# Patient Record
Sex: MALE | Race: WHITE | NOT HISPANIC OR LATINO | Employment: OTHER | URBAN - METROPOLITAN AREA
[De-identification: names, ages, dates, MRNs, and addresses within clinical notes are randomized per-mention and may not be internally consistent; named-entity substitution may affect disease eponyms.]

---

## 2018-02-01 ENCOUNTER — APPOINTMENT (OUTPATIENT)
Dept: CT IMAGING | Facility: HOSPITAL | Age: 83
End: 2018-02-01

## 2018-02-01 ENCOUNTER — APPOINTMENT (OUTPATIENT)
Dept: GENERAL RADIOLOGY | Facility: HOSPITAL | Age: 83
End: 2018-02-01

## 2018-02-01 ENCOUNTER — HOSPITAL ENCOUNTER (INPATIENT)
Facility: HOSPITAL | Age: 83
LOS: 4 days | Discharge: HOME | End: 2018-02-05
Attending: EMERGENCY MEDICINE | Admitting: HOSPITALIST

## 2018-02-01 DIAGNOSIS — R79.89 ELEVATED LFTS: ICD-10-CM

## 2018-02-01 DIAGNOSIS — K80.51 CALCULUS OF BILE DUCT WITHOUT CHOLECYSTITIS WITH OBSTRUCTION: ICD-10-CM

## 2018-02-01 DIAGNOSIS — R79.1 SUPRATHERAPEUTIC INR: ICD-10-CM

## 2018-02-01 DIAGNOSIS — E80.6 HYPERBILIRUBINEMIA: ICD-10-CM

## 2018-02-01 DIAGNOSIS — R10.9 ABDOMINAL PAIN, UNSPECIFIED ABDOMINAL LOCATION: Primary | ICD-10-CM

## 2018-02-01 DIAGNOSIS — D72.829 LEUKOCYTOSIS, UNSPECIFIED TYPE: ICD-10-CM

## 2018-02-01 PROBLEM — I10 ESSENTIAL HYPERTENSION: Status: ACTIVE | Noted: 2018-02-01

## 2018-02-01 PROBLEM — I25.10 CAD (CORONARY ARTERY DISEASE): Status: ACTIVE | Noted: 2018-02-01

## 2018-02-01 PROBLEM — J90 PLEURAL EFFUSION, BILATERAL: Status: ACTIVE | Noted: 2018-02-01

## 2018-02-01 PROBLEM — I48.91 A-FIB: Status: ACTIVE | Noted: 2018-02-01

## 2018-02-01 PROBLEM — E78.5 HYPERLIPIDEMIA: Status: ACTIVE | Noted: 2018-02-01

## 2018-02-01 LAB
ALBUMIN SERPL-MCNC: 3.5 G/DL (ref 3.2–4.8)
ALBUMIN/GLOB SERPL: 1.3 G/DL (ref 1.5–2.5)
ALP SERPL-CCNC: 650 U/L (ref 25–100)
ALT SERPL W P-5'-P-CCNC: 225 U/L (ref 7–40)
ANION GAP SERPL CALCULATED.3IONS-SCNC: 8 MMOL/L (ref 3–11)
AST SERPL-CCNC: 154 U/L (ref 0–33)
BACTERIA UR QL AUTO: ABNORMAL /HPF
BASOPHILS # BLD AUTO: 0.02 10*3/MM3 (ref 0–0.2)
BASOPHILS NFR BLD AUTO: 0.1 % (ref 0–1)
BILIRUB SERPL-MCNC: 5.6 MG/DL (ref 0.3–1.2)
BILIRUB UR QL STRIP: ABNORMAL
BUN BLD-MCNC: 22 MG/DL (ref 9–23)
BUN/CREAT SERPL: 20 (ref 7–25)
CALCIUM SPEC-SCNC: 8.9 MG/DL (ref 8.7–10.4)
CHLORIDE SERPL-SCNC: 103 MMOL/L (ref 99–109)
CLARITY UR: ABNORMAL
CO2 SERPL-SCNC: 25 MMOL/L (ref 20–31)
COLOR UR: ABNORMAL
CREAT BLD-MCNC: 1.1 MG/DL (ref 0.6–1.3)
DEPRECATED RDW RBC AUTO: 49.1 FL (ref 37–54)
EOSINOPHIL # BLD AUTO: 0.08 10*3/MM3 (ref 0–0.3)
EOSINOPHIL NFR BLD AUTO: 0.5 % (ref 0–3)
ERYTHROCYTE [DISTWIDTH] IN BLOOD BY AUTOMATED COUNT: 15.2 % (ref 11.3–14.5)
GFR SERPL CREATININE-BSD FRML MDRD: 63 ML/MIN/1.73
GLOBULIN UR ELPH-MCNC: 2.7 GM/DL
GLUCOSE BLD-MCNC: 106 MG/DL (ref 70–100)
GLUCOSE UR STRIP-MCNC: NEGATIVE MG/DL
HCT VFR BLD AUTO: 43.2 % (ref 38.9–50.9)
HGB BLD-MCNC: 14.2 G/DL (ref 13.1–17.5)
HGB UR QL STRIP.AUTO: NEGATIVE
HYALINE CASTS UR QL AUTO: ABNORMAL /LPF
IMM GRANULOCYTES # BLD: 0.09 10*3/MM3 (ref 0–0.03)
IMM GRANULOCYTES NFR BLD: 0.5 % (ref 0–0.6)
INR PPP: 4.86
INR PPP: 5.25
KETONES UR QL STRIP: NEGATIVE
LEUKOCYTE ESTERASE UR QL STRIP.AUTO: ABNORMAL
LIPASE SERPL-CCNC: 82 U/L (ref 6–51)
LYMPHOCYTES # BLD AUTO: 0.73 10*3/MM3 (ref 0.6–4.8)
LYMPHOCYTES NFR BLD AUTO: 4.4 % (ref 24–44)
MCH RBC QN AUTO: 28.9 PG (ref 27–31)
MCHC RBC AUTO-ENTMCNC: 32.9 G/DL (ref 32–36)
MCV RBC AUTO: 88 FL (ref 80–99)
MONOCYTES # BLD AUTO: 1.2 10*3/MM3 (ref 0–1)
MONOCYTES NFR BLD AUTO: 7.3 % (ref 0–12)
MUCOUS THREADS URNS QL MICRO: ABNORMAL /HPF
NEUTROPHILS # BLD AUTO: 14.42 10*3/MM3 (ref 1.5–8.3)
NEUTROPHILS NFR BLD AUTO: 87.2 % (ref 41–71)
NITRITE UR QL STRIP: NEGATIVE
PH UR STRIP.AUTO: 5.5 [PH] (ref 5–8)
PLATELET # BLD AUTO: 331 10*3/MM3 (ref 150–450)
PMV BLD AUTO: 11.5 FL (ref 6–12)
POTASSIUM BLD-SCNC: 4.6 MMOL/L (ref 3.5–5.5)
PROT SERPL-MCNC: 6.2 G/DL (ref 5.7–8.2)
PROT UR QL STRIP: ABNORMAL
PROTHROMBIN TIME: 55.6 SECONDS (ref 9.6–11.5)
PROTHROMBIN TIME: 60.2 SECONDS (ref 9.6–11.5)
RBC # BLD AUTO: 4.91 10*6/MM3 (ref 4.2–5.76)
RBC # UR: ABNORMAL /HPF
REF LAB TEST METHOD: ABNORMAL
SODIUM BLD-SCNC: 136 MMOL/L (ref 132–146)
SP GR UR STRIP: 1.02 (ref 1–1.03)
SQUAMOUS #/AREA URNS HPF: ABNORMAL /HPF
UROBILINOGEN UR QL STRIP: ABNORMAL
WBC NRBC COR # BLD: 16.54 10*3/MM3 (ref 3.5–10.8)
WBC UR QL AUTO: ABNORMAL /HPF

## 2018-02-01 PROCEDURE — 80053 COMPREHEN METABOLIC PANEL: CPT | Performed by: EMERGENCY MEDICINE

## 2018-02-01 PROCEDURE — 93005 ELECTROCARDIOGRAM TRACING: CPT | Performed by: EMERGENCY MEDICINE

## 2018-02-01 PROCEDURE — 83690 ASSAY OF LIPASE: CPT | Performed by: EMERGENCY MEDICINE

## 2018-02-01 PROCEDURE — 85610 PROTHROMBIN TIME: CPT | Performed by: NURSE PRACTITIONER

## 2018-02-01 PROCEDURE — 85025 COMPLETE CBC W/AUTO DIFF WBC: CPT | Performed by: EMERGENCY MEDICINE

## 2018-02-01 PROCEDURE — 0 IOPAMIDOL 61 % SOLUTION: Performed by: EMERGENCY MEDICINE

## 2018-02-01 PROCEDURE — 99223 1ST HOSP IP/OBS HIGH 75: CPT | Performed by: INTERNAL MEDICINE

## 2018-02-01 PROCEDURE — 81001 URINALYSIS AUTO W/SCOPE: CPT | Performed by: EMERGENCY MEDICINE

## 2018-02-01 PROCEDURE — 87086 URINE CULTURE/COLONY COUNT: CPT | Performed by: EMERGENCY MEDICINE

## 2018-02-01 PROCEDURE — 71045 X-RAY EXAM CHEST 1 VIEW: CPT

## 2018-02-01 PROCEDURE — 85610 PROTHROMBIN TIME: CPT | Performed by: EMERGENCY MEDICINE

## 2018-02-01 PROCEDURE — 74177 CT ABD & PELVIS W/CONTRAST: CPT

## 2018-02-01 PROCEDURE — 25010000002 ONDANSETRON PER 1 MG: Performed by: EMERGENCY MEDICINE

## 2018-02-01 PROCEDURE — 99285 EMERGENCY DEPT VISIT HI MDM: CPT

## 2018-02-01 RX ORDER — PANTOPRAZOLE SODIUM 40 MG/1
40 TABLET, DELAYED RELEASE ORAL DAILY
COMMUNITY
End: 2022-10-18

## 2018-02-01 RX ORDER — ONDANSETRON 2 MG/ML
4 INJECTION INTRAMUSCULAR; INTRAVENOUS ONCE
Status: COMPLETED | OUTPATIENT
Start: 2018-02-01 | End: 2018-02-01

## 2018-02-01 RX ORDER — LEVOTHYROXINE SODIUM 0.03 MG/1
25 TABLET ORAL DAILY
COMMUNITY
End: 2022-03-22 | Stop reason: SDUPTHER

## 2018-02-01 RX ORDER — TERAZOSIN 5 MG/1
5 CAPSULE ORAL NIGHTLY
COMMUNITY
End: 2022-04-06 | Stop reason: SDUPTHER

## 2018-02-01 RX ORDER — ALLOPURINOL 100 MG/1
100 TABLET ORAL DAILY
COMMUNITY
End: 2022-04-06 | Stop reason: SDUPTHER

## 2018-02-01 RX ORDER — METOPROLOL TARTRATE 50 MG/1
50 TABLET, FILM COATED ORAL DAILY
COMMUNITY
End: 2022-08-04

## 2018-02-01 RX ORDER — FUROSEMIDE 20 MG/1
20 TABLET ORAL DAILY
COMMUNITY
End: 2022-04-06 | Stop reason: SDUPTHER

## 2018-02-01 RX ORDER — ONDANSETRON 4 MG/1
4 TABLET, FILM COATED ORAL EVERY 6 HOURS PRN
Status: DISCONTINUED | OUTPATIENT
Start: 2018-02-01 | End: 2018-02-05 | Stop reason: HOSPADM

## 2018-02-01 RX ORDER — PROMETHAZINE HYDROCHLORIDE 25 MG/1
25 TABLET ORAL EVERY 6 HOURS PRN
COMMUNITY
End: 2022-10-18

## 2018-02-01 RX ORDER — ONDANSETRON 2 MG/ML
4 INJECTION INTRAMUSCULAR; INTRAVENOUS EVERY 6 HOURS PRN
Status: DISCONTINUED | OUTPATIENT
Start: 2018-02-01 | End: 2018-02-05 | Stop reason: HOSPADM

## 2018-02-01 RX ORDER — SODIUM CHLORIDE 0.9 % (FLUSH) 0.9 %
10 SYRINGE (ML) INJECTION AS NEEDED
Status: DISCONTINUED | OUTPATIENT
Start: 2018-02-01 | End: 2018-02-05 | Stop reason: HOSPADM

## 2018-02-01 RX ORDER — SODIUM CHLORIDE 9 MG/ML
75 INJECTION, SOLUTION INTRAVENOUS CONTINUOUS
Status: DISCONTINUED | OUTPATIENT
Start: 2018-02-01 | End: 2018-02-04

## 2018-02-01 RX ORDER — POTASSIUM CHLORIDE 20 MEQ/1
20 TABLET, EXTENDED RELEASE ORAL 2 TIMES DAILY
COMMUNITY
End: 2022-04-06 | Stop reason: SDUPTHER

## 2018-02-01 RX ORDER — WARFARIN SODIUM 2.5 MG/1
2.5 TABLET ORAL
COMMUNITY
End: 2022-10-13

## 2018-02-01 RX ORDER — ASPIRIN 81 MG/1
81 TABLET ORAL DAILY
COMMUNITY
End: 2022-10-18

## 2018-02-01 RX ORDER — SIMVASTATIN 10 MG
10 TABLET ORAL NIGHTLY
COMMUNITY
End: 2018-02-05 | Stop reason: HOSPADM

## 2018-02-01 RX ORDER — SODIUM CHLORIDE 0.9 % (FLUSH) 0.9 %
1-10 SYRINGE (ML) INJECTION AS NEEDED
Status: DISCONTINUED | OUTPATIENT
Start: 2018-02-01 | End: 2018-02-05 | Stop reason: HOSPADM

## 2018-02-01 RX ORDER — AMLODIPINE BESYLATE 10 MG/1
10 TABLET ORAL DAILY
COMMUNITY
End: 2022-03-29

## 2018-02-01 RX ADMIN — ONDANSETRON 4 MG: 2 INJECTION INTRAMUSCULAR; INTRAVENOUS at 17:15

## 2018-02-01 RX ADMIN — SODIUM CHLORIDE 75 ML/HR: 9 INJECTION, SOLUTION INTRAVENOUS at 23:10

## 2018-02-01 RX ADMIN — Medication 10 ML: at 17:10

## 2018-02-01 RX ADMIN — Medication 10 ML: at 17:16

## 2018-02-01 RX ADMIN — IOPAMIDOL 95 ML: 612 INJECTION, SOLUTION INTRAVENOUS at 18:18

## 2018-02-01 NOTE — ED PROVIDER NOTES
Subjective   HPI Comments: 88 year-old male presents for evaluation of abnormal labs.  The patient states that for the past couple of weeks, he has been experiencing intermittent right-sided abdominal pain.  He is currently pain-free.  He went to his primary care physician regarding his symptoms last week and was prescribed Pepcid which helped for 3 days before the pain returned.  He returned his primary care physician midweek and labs were drawn.  His physician called him today as his LFTs were markedly abnormal, and he was advised to come to the ED for further evaluation and treatment.  Currently, the patient is asymptomatic.  He denies any abdominal pain or nausea at this time.  However, over the past few days he has been experiencing nausea with his pain.    Patient is a 88 y.o. male presenting with general illness.   History provided by:  Patient  Illness   Severity:  Moderate  Onset quality:  Sudden  Timing:  Constant  Progression:  Worsening  Chronicity:  New  Context:  Abnormal lab results recommended to visit the ED.  Associated symptoms: abdominal pain    Associated symptoms: no diarrhea, no nausea and no vomiting        Review of Systems   Gastrointestinal: Positive for abdominal pain. Negative for diarrhea, nausea and vomiting.   All other systems reviewed and are negative.      Past Medical History:   Diagnosis Date   • Hard of hearing        Allergies   Allergen Reactions   • Penicillins Swelling       Past Surgical History:   Procedure Laterality Date   • CHOLECYSTECTOMY     • HERNIA REPAIR         History reviewed. No pertinent family history.    Social History     Social History   • Marital status:      Spouse name: N/A   • Number of children: N/A   • Years of education: N/A     Social History Main Topics   • Smoking status: Never Smoker   • Smokeless tobacco: None   • Alcohol use No   • Drug use: No   • Sexual activity: Not Asked     Other Topics Concern   • None     Social History Narrative    • None         Objective   Physical Exam   Constitutional: He is oriented to person, place, and time. He appears well-developed and well-nourished. No distress.   Well-appearing elderly male in no acute distress   HENT:   Head: Normocephalic and atraumatic.   Mouth/Throat: Oropharynx is clear and moist.   Eyes:   Sclera icteric   Neck: Normal range of motion. No JVD present.   Cardiovascular: Normal rate, regular rhythm and normal heart sounds.  Exam reveals no gallop and no friction rub.    No murmur heard.  Pulmonary/Chest: Effort normal and breath sounds normal. No respiratory distress. He has no wheezes. He has no rales.   Abdominal: Soft. Bowel sounds are normal. He exhibits no distension and no mass. There is no tenderness. There is no guarding.   No focal tenderness to palpation; no peritoneal signs   Musculoskeletal: Normal range of motion. He exhibits no edema.   Neurological: He is alert and oriented to person, place, and time.   Skin: Skin is warm and dry. No rash noted. He is not diaphoretic. No erythema. No pallor.   Psychiatric: He has a normal mood and affect. Judgment and thought content normal.   Nursing note and vitals reviewed.      Procedures         ED Course  ED Course   Comment By Time   88-year-old male sent from his primary care physician's office for evaluation of abnormal labs.  Of note, the patient has been experiencing intermittent right-sided abdominal pain for the past several weeks.  He had labs drawn this week that revealed markedly elevated LFTs, prompting his physician to send him to the ED for evaluation.  Currently, the patient denies any pain.  Nonsurgical abdomen.  Sclera icteric.  We will repeat labs and obtain abdominal imaging and reassess after initial interventions. Gordo Ramon MD 02/01 1745   Labs remarkable for white blood cell count of 16, INR greater than 5, elevated LFTs, and elevated total bilirubin. Gordo Ramon MD 02/01 1903   CT remarkable for  biliary ductal dilatation with a filling defect in the distal common bile duct which could be secondary to either a calcified stone or a mass.  Given CT findings and lab abnormalities, I will seek admission to the hospital for an MRCP, likely GI consult, and further evaluation and treatment. Gordo Ramon MD 02/01 1917   Discussed case with Dr. Green and will admit for further evaluation and treatment.   hemodynamically stable and aware/agreeable with plan. Gordo Ramon MD 02/01 2005     Recent Results (from the past 24 hour(s))   Comprehensive Metabolic Panel    Collection Time: 02/01/18  5:11 PM   Result Value Ref Range    Glucose 106 (H) 70 - 100 mg/dL    BUN 22 9 - 23 mg/dL    Creatinine 1.10 0.60 - 1.30 mg/dL    Sodium 136 132 - 146 mmol/L    Potassium 4.6 3.5 - 5.5 mmol/L    Chloride 103 99 - 109 mmol/L    CO2 25.0 20.0 - 31.0 mmol/L    Calcium 8.9 8.7 - 10.4 mg/dL    Total Protein 6.2 5.7 - 8.2 g/dL    Albumin 3.50 3.20 - 4.80 g/dL    ALT (SGPT) 225 (H) 7 - 40 U/L    AST (SGOT) 154 (H) 0 - 33 U/L    Alkaline Phosphatase 650 (H) 25 - 100 U/L    Total Bilirubin 5.6 (H) 0.3 - 1.2 mg/dL    eGFR Non African Amer 63 >60 mL/min/1.73    Globulin 2.7 gm/dL    A/G Ratio 1.3 (L) 1.5 - 2.5 g/dL    BUN/Creatinine Ratio 20.0 7.0 - 25.0    Anion Gap 8.0 3.0 - 11.0 mmol/L   Lipase    Collection Time: 02/01/18  5:11 PM   Result Value Ref Range    Lipase 82 (H) 6 - 51 U/L   Protime-INR    Collection Time: 02/01/18  5:11 PM   Result Value Ref Range    Protime 60.2 (C) 9.6 - 11.5 Seconds    INR 5.25    CBC Auto Differential    Collection Time: 02/01/18  5:11 PM   Result Value Ref Range    WBC 16.54 (H) 3.50 - 10.80 10*3/mm3    RBC 4.91 4.20 - 5.76 10*6/mm3    Hemoglobin 14.2 13.1 - 17.5 g/dL    Hematocrit 43.2 38.9 - 50.9 %    MCV 88.0 80.0 - 99.0 fL    MCH 28.9 27.0 - 31.0 pg    MCHC 32.9 32.0 - 36.0 g/dL    RDW 15.2 (H) 11.3 - 14.5 %    RDW-SD 49.1 37.0 - 54.0 fl    MPV 11.5 6.0 - 12.0 fL    Platelets 331 150  - 450 10*3/mm3    Neutrophil % 87.2 (H) 41.0 - 71.0 %    Lymphocyte % 4.4 (L) 24.0 - 44.0 %    Monocyte % 7.3 0.0 - 12.0 %    Eosinophil % 0.5 0.0 - 3.0 %    Basophil % 0.1 0.0 - 1.0 %    Immature Grans % 0.5 0.0 - 0.6 %    Neutrophils, Absolute 14.42 (H) 1.50 - 8.30 10*3/mm3    Lymphocytes, Absolute 0.73 0.60 - 4.80 10*3/mm3    Monocytes, Absolute 1.20 (H) 0.00 - 1.00 10*3/mm3    Eosinophils, Absolute 0.08 0.00 - 0.30 10*3/mm3    Basophils, Absolute 0.02 0.00 - 0.20 10*3/mm3    Immature Grans, Absolute 0.09 (H) 0.00 - 0.03 10*3/mm3   Urinalysis With / Culture If Indicated - Urine, Clean Catch    Collection Time: 02/01/18  5:26 PM   Result Value Ref Range    Color, UA Orange (A) Yellow, Straw    Appearance, UA Cloudy (A) Clear    pH, UA 5.5 5.0 - 8.0    Specific Gravity, UA 1.020 1.001 - 1.030    Glucose, UA Negative Negative    Ketones, UA Negative Negative    Bilirubin, UA Large (3+) (A) Negative    Blood, UA Negative Negative    Protein,  mg/dL (2+) (A) Negative    Leuk Esterase, UA Small (1+) (A) Negative    Nitrite, UA Negative Negative    Urobilinogen, UA 1.0 E.U./dL 0.2 - 1.0 E.U./dL   Urinalysis, Microscopic Only - Urine, Clean Catch    Collection Time: 02/01/18  5:26 PM   Result Value Ref Range    RBC, UA 0-2 None Seen, 0-2 /HPF    WBC, UA 0-2 (A) None Seen /HPF    Bacteria, UA Trace None Seen, Trace /HPF    Squamous Epithelial Cells, UA 0-2 None Seen, 0-2 /HPF    Hyaline Casts, UA 0-6 0 - 6 /LPF    Mucus, UA Trace None Seen, Trace /HPF    Methodology Manual Light Microscopy      Note: In addition to lab results from this visit, the labs listed above may include labs taken at another facility or during a different encounter within the last 24 hours. Please correlate lab times with ED admission and discharge times for further clarification of the services performed during this visit.    CT Abdomen Pelvis With Contrast   Final Result     Biliary ductal dilatation.Asymptomatic bile duct dilatation is  "considered    normal after cholecystectomy. However there is question of a slightly    hyperdense filling defect in distal common bile duct which could be partially    calcified stone or soft tissue mass. Clinical correlation is recommended as to    whether the patient is symptomatic. MRCP would be helpful for further    evaluation.      THIS DOCUMENT HAS BEEN ELECTRONICALLY SIGNED BY LAKESHA VICENTE MD      XR Chest 1 View   Final Result   Cardiomegaly with increased central pulmonary vascularity   and ill-defined bibasilar pulmonary opacifications greatest on the   right. Findings are most consistent with probable trace bilateral   pleural effusions.       DICTATED:     02/01/2018   EDITED    :     02/01/2018        This report was finalized on 2/1/2018 5:41 PM by Dr. Alden Kent.          MRI abdomen wo contrast mrcp    (Results Pending)     Vitals:    02/01/18 1621 02/01/18 1728 02/01/18 1800 02/01/18 1900   BP: 139/82 131/85 125/81 132/87   BP Location: Left arm      Patient Position: Sitting      Pulse: 92 88 83 80   Resp: 16 16     Temp: 97.6 °F (36.4 °C)      TempSrc: Oral      SpO2: 97% 97% 96% 96%   Weight: 83.5 kg (184 lb)      Height: 175.3 cm (69\")        Medications   sodium chloride 0.9 % flush 10 mL (10 mL Intravenous Given 2/1/18 1716)   ondansetron (ZOFRAN) injection 4 mg (4 mg Intravenous Given 2/1/18 1715)   iopamidol (ISOVUE-300) 61 % injection 100 mL (95 mL Intravenous Given 2/1/18 1818)     ECG/EMG Results (last 24 hours)     ** No results found for the last 24 hours. **                    Recent Results (from the past 24 hour(s))   Comprehensive Metabolic Panel    Collection Time: 02/01/18  5:11 PM   Result Value Ref Range    Glucose 106 (H) 70 - 100 mg/dL    BUN 22 9 - 23 mg/dL    Creatinine 1.10 0.60 - 1.30 mg/dL    Sodium 136 132 - 146 mmol/L    Potassium 4.6 3.5 - 5.5 mmol/L    Chloride 103 99 - 109 mmol/L    CO2 25.0 20.0 - 31.0 mmol/L    Calcium 8.9 8.7 - 10.4 mg/dL    Total Protein 6.2 " 5.7 - 8.2 g/dL    Albumin 3.50 3.20 - 4.80 g/dL    ALT (SGPT) 225 (H) 7 - 40 U/L    AST (SGOT) 154 (H) 0 - 33 U/L    Alkaline Phosphatase 650 (H) 25 - 100 U/L    Total Bilirubin 5.6 (H) 0.3 - 1.2 mg/dL    eGFR Non African Amer 63 >60 mL/min/1.73    Globulin 2.7 gm/dL    A/G Ratio 1.3 (L) 1.5 - 2.5 g/dL    BUN/Creatinine Ratio 20.0 7.0 - 25.0    Anion Gap 8.0 3.0 - 11.0 mmol/L   Lipase    Collection Time: 02/01/18  5:11 PM   Result Value Ref Range    Lipase 82 (H) 6 - 51 U/L   Protime-INR    Collection Time: 02/01/18  5:11 PM   Result Value Ref Range    Protime 60.2 (C) 9.6 - 11.5 Seconds    INR 5.25    CBC Auto Differential    Collection Time: 02/01/18  5:11 PM   Result Value Ref Range    WBC 16.54 (H) 3.50 - 10.80 10*3/mm3    RBC 4.91 4.20 - 5.76 10*6/mm3    Hemoglobin 14.2 13.1 - 17.5 g/dL    Hematocrit 43.2 38.9 - 50.9 %    MCV 88.0 80.0 - 99.0 fL    MCH 28.9 27.0 - 31.0 pg    MCHC 32.9 32.0 - 36.0 g/dL    RDW 15.2 (H) 11.3 - 14.5 %    RDW-SD 49.1 37.0 - 54.0 fl    MPV 11.5 6.0 - 12.0 fL    Platelets 331 150 - 450 10*3/mm3    Neutrophil % 87.2 (H) 41.0 - 71.0 %    Lymphocyte % 4.4 (L) 24.0 - 44.0 %    Monocyte % 7.3 0.0 - 12.0 %    Eosinophil % 0.5 0.0 - 3.0 %    Basophil % 0.1 0.0 - 1.0 %    Immature Grans % 0.5 0.0 - 0.6 %    Neutrophils, Absolute 14.42 (H) 1.50 - 8.30 10*3/mm3    Lymphocytes, Absolute 0.73 0.60 - 4.80 10*3/mm3    Monocytes, Absolute 1.20 (H) 0.00 - 1.00 10*3/mm3    Eosinophils, Absolute 0.08 0.00 - 0.30 10*3/mm3    Basophils, Absolute 0.02 0.00 - 0.20 10*3/mm3    Immature Grans, Absolute 0.09 (H) 0.00 - 0.03 10*3/mm3   Urinalysis With / Culture If Indicated - Urine, Clean Catch    Collection Time: 02/01/18  5:26 PM   Result Value Ref Range    Color, UA Orange (A) Yellow, Straw    Appearance, UA Cloudy (A) Clear    pH, UA 5.5 5.0 - 8.0    Specific Gravity, UA 1.020 1.001 - 1.030    Glucose, UA Negative Negative    Ketones, UA Negative Negative    Bilirubin, UA Large (3+) (A) Negative    Blood,  UA Negative Negative    Protein,  mg/dL (2+) (A) Negative    Leuk Esterase, UA Small (1+) (A) Negative    Nitrite, UA Negative Negative    Urobilinogen, UA 1.0 E.U./dL 0.2 - 1.0 E.U./dL   Urinalysis, Microscopic Only - Urine, Clean Catch    Collection Time: 02/01/18  5:26 PM   Result Value Ref Range    RBC, UA 0-2 None Seen, 0-2 /HPF    WBC, UA 0-2 (A) None Seen /HPF    Bacteria, UA Trace None Seen, Trace /HPF    Squamous Epithelial Cells, UA 0-2 None Seen, 0-2 /HPF    Hyaline Casts, UA 0-6 0 - 6 /LPF    Mucus, UA Trace None Seen, Trace /HPF    Methodology Manual Light Microscopy      Note: In addition to lab results from this visit, the labs listed above may include labs taken at another facility or during a different encounter within the last 24 hours. Please correlate lab times with ED admission and discharge times for further clarification of the services performed during this visit.    CT Abdomen Pelvis With Contrast   Final Result     Biliary ductal dilatation.Asymptomatic bile duct dilatation is considered    normal after cholecystectomy. However there is question of a slightly    hyperdense filling defect in distal common bile duct which could be partially    calcified stone or soft tissue mass. Clinical correlation is recommended as to    whether the patient is symptomatic. MRCP would be helpful for further    evaluation.      THIS DOCUMENT HAS BEEN ELECTRONICALLY SIGNED BY LAKESHA VICENTE MD      XR Chest 1 View   Final Result   Cardiomegaly with increased central pulmonary vascularity   and ill-defined bibasilar pulmonary opacifications greatest on the   right. Findings are most consistent with probable trace bilateral   pleural effusions.       DICTATED:     02/01/2018   EDITED    :     02/01/2018        This report was finalized on 2/1/2018 5:41 PM by Dr. Alden Kent.          MRI abdomen wo contrast mrcp    (Results Pending)     Vitals:    02/01/18 1728 02/01/18 1800 02/01/18 1900 02/01/18 1930    BP: 131/85 125/81 132/87 144/97   BP Location:       Patient Position:       Pulse: 88 83 80 80   Resp: 16   16   Temp:       TempSrc:       SpO2: 97% 96% 96% 95%   Weight:       Height:         Medications   sodium chloride 0.9 % flush 10 mL (10 mL Intravenous Given 2/1/18 1716)   ondansetron (ZOFRAN) injection 4 mg (4 mg Intravenous Given 2/1/18 1715)   iopamidol (ISOVUE-300) 61 % injection 100 mL (95 mL Intravenous Given 2/1/18 1818)     ECG/EMG Results (last 24 hours)     Procedure Component Value Units Date/Time    ECG 12 Lead [[id number]] Collected:  02/01/18 1731     Updated:  02/01/18 1808            MDM    Final diagnoses:   Abdominal pain, unspecified abdominal location   Hyperbilirubinemia   Leukocytosis, unspecified type   Elevated LFTs   Supratherapeutic INR       Documentation assistance provided by geoffrey Rader.  Information recorded by the geoffrey was done at my direction and has been verified and validated by me.     Issac Rader  02/01/18 1652       Issac Rader  02/01/18 1930       Gordo Ramon MD  02/01/18 2005

## 2018-02-02 ENCOUNTER — APPOINTMENT (OUTPATIENT)
Dept: MRI IMAGING | Facility: HOSPITAL | Age: 83
End: 2018-02-02

## 2018-02-02 PROBLEM — K80.51 CALCULUS OF BILE DUCT WITHOUT CHOLECYSTITIS WITH OBSTRUCTION: Status: ACTIVE | Noted: 2018-02-01

## 2018-02-02 LAB
ALBUMIN SERPL-MCNC: 3.5 G/DL (ref 3.2–4.8)
ALBUMIN/GLOB SERPL: 1.5 G/DL (ref 1.5–2.5)
ALP SERPL-CCNC: 584 U/L (ref 25–100)
ALT SERPL W P-5'-P-CCNC: 178 U/L (ref 7–40)
ANION GAP SERPL CALCULATED.3IONS-SCNC: 9 MMOL/L (ref 3–11)
AST SERPL-CCNC: 79 U/L (ref 0–33)
BASOPHILS # BLD AUTO: 0.04 10*3/MM3 (ref 0–0.2)
BASOPHILS NFR BLD AUTO: 0.3 % (ref 0–1)
BILIRUB SERPL-MCNC: 3.8 MG/DL (ref 0.3–1.2)
BUN BLD-MCNC: 23 MG/DL (ref 9–23)
BUN/CREAT SERPL: 20.9 (ref 7–25)
CALCIUM SPEC-SCNC: 8.4 MG/DL (ref 8.7–10.4)
CHLORIDE SERPL-SCNC: 101 MMOL/L (ref 99–109)
CO2 SERPL-SCNC: 28 MMOL/L (ref 20–31)
CREAT BLD-MCNC: 1.1 MG/DL (ref 0.6–1.3)
DEPRECATED RDW RBC AUTO: 49.2 FL (ref 37–54)
EOSINOPHIL # BLD AUTO: 0.26 10*3/MM3 (ref 0–0.3)
EOSINOPHIL NFR BLD AUTO: 2 % (ref 0–3)
ERYTHROCYTE [DISTWIDTH] IN BLOOD BY AUTOMATED COUNT: 15.3 % (ref 11.3–14.5)
GFR SERPL CREATININE-BSD FRML MDRD: 63 ML/MIN/1.73
GLOBULIN UR ELPH-MCNC: 2.4 GM/DL
GLUCOSE BLD-MCNC: 75 MG/DL (ref 70–100)
HCT VFR BLD AUTO: 42.3 % (ref 38.9–50.9)
HGB BLD-MCNC: 13.9 G/DL (ref 13.1–17.5)
IMM GRANULOCYTES # BLD: 0.06 10*3/MM3 (ref 0–0.03)
IMM GRANULOCYTES NFR BLD: 0.5 % (ref 0–0.6)
INR PPP: 4.28
LYMPHOCYTES # BLD AUTO: 1.11 10*3/MM3 (ref 0.6–4.8)
LYMPHOCYTES NFR BLD AUTO: 8.5 % (ref 24–44)
MCH RBC QN AUTO: 29 PG (ref 27–31)
MCHC RBC AUTO-ENTMCNC: 32.9 G/DL (ref 32–36)
MCV RBC AUTO: 88.1 FL (ref 80–99)
MONOCYTES # BLD AUTO: 1.07 10*3/MM3 (ref 0–1)
MONOCYTES NFR BLD AUTO: 8.2 % (ref 0–12)
NEUTROPHILS # BLD AUTO: 10.46 10*3/MM3 (ref 1.5–8.3)
NEUTROPHILS NFR BLD AUTO: 80.5 % (ref 41–71)
PLATELET # BLD AUTO: 324 10*3/MM3 (ref 150–450)
PMV BLD AUTO: 11.1 FL (ref 6–12)
POTASSIUM BLD-SCNC: 3.4 MMOL/L (ref 3.5–5.5)
PROT SERPL-MCNC: 5.9 G/DL (ref 5.7–8.2)
PROTHROMBIN TIME: 48.8 SECONDS (ref 9.6–11.5)
RBC # BLD AUTO: 4.8 10*6/MM3 (ref 4.2–5.76)
SODIUM BLD-SCNC: 138 MMOL/L (ref 132–146)
WBC NRBC COR # BLD: 13 10*3/MM3 (ref 3.5–10.8)

## 2018-02-02 PROCEDURE — 85025 COMPLETE CBC W/AUTO DIFF WBC: CPT | Performed by: NURSE PRACTITIONER

## 2018-02-02 PROCEDURE — 74181 MRI ABDOMEN W/O CONTRAST: CPT

## 2018-02-02 PROCEDURE — 99233 SBSQ HOSP IP/OBS HIGH 50: CPT | Performed by: HOSPITALIST

## 2018-02-02 PROCEDURE — 86900 BLOOD TYPING SEROLOGIC ABO: CPT

## 2018-02-02 PROCEDURE — 85610 PROTHROMBIN TIME: CPT | Performed by: NURSE PRACTITIONER

## 2018-02-02 PROCEDURE — 25010000002 VITAMIN K1 PER 1 MG: Performed by: HOSPITALIST

## 2018-02-02 PROCEDURE — 99222 1ST HOSP IP/OBS MODERATE 55: CPT | Performed by: PHYSICIAN ASSISTANT

## 2018-02-02 PROCEDURE — 86901 BLOOD TYPING SEROLOGIC RH(D): CPT

## 2018-02-02 PROCEDURE — 80053 COMPREHEN METABOLIC PANEL: CPT | Performed by: NURSE PRACTITIONER

## 2018-02-02 RX ORDER — TERAZOSIN 5 MG/1
5 CAPSULE ORAL NIGHTLY
Status: DISCONTINUED | OUTPATIENT
Start: 2018-02-02 | End: 2018-02-05 | Stop reason: HOSPADM

## 2018-02-02 RX ORDER — ALLOPURINOL 100 MG/1
100 TABLET ORAL DAILY
Status: DISCONTINUED | OUTPATIENT
Start: 2018-02-02 | End: 2018-02-05 | Stop reason: HOSPADM

## 2018-02-02 RX ORDER — PHYTONADIONE 2 MG/ML
5 INJECTION, EMULSION INTRAMUSCULAR; INTRAVENOUS; SUBCUTANEOUS ONCE
Status: COMPLETED | OUTPATIENT
Start: 2018-02-02 | End: 2018-02-02

## 2018-02-02 RX ORDER — METOPROLOL TARTRATE 50 MG/1
50 TABLET, FILM COATED ORAL DAILY
Status: DISCONTINUED | OUTPATIENT
Start: 2018-02-02 | End: 2018-02-05 | Stop reason: HOSPADM

## 2018-02-02 RX ORDER — POTASSIUM CHLORIDE 750 MG/1
10 CAPSULE, EXTENDED RELEASE ORAL DAILY
Status: DISCONTINUED | OUTPATIENT
Start: 2018-02-02 | End: 2018-02-05 | Stop reason: HOSPADM

## 2018-02-02 RX ORDER — AMLODIPINE BESYLATE 10 MG/1
10 TABLET ORAL DAILY
Status: DISCONTINUED | OUTPATIENT
Start: 2018-02-02 | End: 2018-02-05 | Stop reason: HOSPADM

## 2018-02-02 RX ORDER — ASPIRIN 81 MG/1
81 TABLET ORAL DAILY
Status: DISCONTINUED | OUTPATIENT
Start: 2018-02-02 | End: 2018-02-05 | Stop reason: HOSPADM

## 2018-02-02 RX ORDER — PANTOPRAZOLE SODIUM 40 MG/1
40 TABLET, DELAYED RELEASE ORAL
Status: DISCONTINUED | OUTPATIENT
Start: 2018-02-02 | End: 2018-02-05 | Stop reason: HOSPADM

## 2018-02-02 RX ORDER — FUROSEMIDE 20 MG/1
20 TABLET ORAL DAILY
Status: DISCONTINUED | OUTPATIENT
Start: 2018-02-02 | End: 2018-02-05 | Stop reason: HOSPADM

## 2018-02-02 RX ORDER — LEVOTHYROXINE SODIUM 0.03 MG/1
25 TABLET ORAL
Status: DISCONTINUED | OUTPATIENT
Start: 2018-02-02 | End: 2018-02-05 | Stop reason: HOSPADM

## 2018-02-02 RX ADMIN — FUROSEMIDE 20 MG: 20 TABLET ORAL at 11:26

## 2018-02-02 RX ADMIN — POTASSIUM CHLORIDE 10 MEQ: 750 CAPSULE, EXTENDED RELEASE ORAL at 11:26

## 2018-02-02 RX ADMIN — SODIUM CHLORIDE 75 ML/HR: 9 INJECTION, SOLUTION INTRAVENOUS at 15:26

## 2018-02-02 RX ADMIN — WATER 5 MG: 1 INJECTION INTRAMUSCULAR; INTRAVENOUS; SUBCUTANEOUS at 15:01

## 2018-02-02 RX ADMIN — AMLODIPINE BESYLATE 10 MG: 10 TABLET ORAL at 11:24

## 2018-02-02 RX ADMIN — METOPROLOL TARTRATE 50 MG: 50 TABLET ORAL at 11:27

## 2018-02-02 RX ADMIN — LEVOTHYROXINE SODIUM 25 MCG: 25 TABLET ORAL at 11:24

## 2018-02-02 RX ADMIN — TERAZOSIN HYDROCHLORIDE ANHYDROUS 5 MG: 5 CAPSULE ORAL at 20:51

## 2018-02-02 RX ADMIN — PANTOPRAZOLE SODIUM 40 MG: 40 TABLET, DELAYED RELEASE ORAL at 11:26

## 2018-02-02 RX ADMIN — ALLOPURINOL 100 MG: 100 TABLET ORAL at 11:27

## 2018-02-02 NOTE — PLAN OF CARE
Problem: Patient Care Overview (Adult)  Goal: Plan of Care Review  Outcome: Ongoing (interventions implemented as appropriate)   02/01/18 1365   Coping/Psychosocial Response Interventions   Plan Of Care Reviewed With patient   Patient Care Overview   Progress no change   Outcome Evaluation   Outcome Summary/Follow up Plan pt arrived to floor and is NPO for MRCP     Goal: Adult Individualization and Mutuality  Outcome: Ongoing (interventions implemented as appropriate)    Goal: Discharge Needs Assessment  Outcome: Ongoing (interventions implemented as appropriate)      Problem: Pain, Acute (Adult)  Goal: Identify Related Risk Factors and Signs and Symptoms  Outcome: Ongoing (interventions implemented as appropriate)    Goal: Acceptable Pain Control/Comfort Level  Outcome: Ongoing (interventions implemented as appropriate)

## 2018-02-02 NOTE — CONSULTS
[facility]    Referring Provider: Lizz Tom MD   PCP: Kelechi Calvert MD    Reason for Consultation: Choledocholithiasis     Chief complaint: Epigastric pain     History of present illness:    Tanner Douglas Jr. is a 88 y.o. male who is admitted with choledocholithiasis.  He was evaluated by his primary physician with a chief complaint of epigastric and right upper quadrant pain for five days.  He was started on pantoprazole with some relief.  He had outpatient labs reveal elevated LFTs and he was counseled to present to the ED.  Labs revealed ALT//154; Bilirubin 5.6 and Alk phos 650.  MRCP showed choledocholithiasis with 10 mm diameter low signal downstream CBD stone and dilated common bile duct (14 mm).    His abdominal pain is described as intermittent epigastric pain radiating to RUQ and his back.  Not related to meals.  He does voice less appetite and po intake over the last week.      Allergies:  Morphine and related and Penicillins    Scheduled Meds:    allopurinol 100 mg Oral Daily   amLODIPine 10 mg Oral Daily   aspirin 81 mg Oral Daily   furosemide 20 mg Oral Daily   levothyroxine 25 mcg Oral Q AM   metoprolol tartrate 50 mg Oral Daily   pantoprazole 40 mg Oral Q AM   potassium chloride 10 mEq Oral Daily   terazosin 5 mg Oral Nightly        Infusions:    sodium chloride 75 mL/hr Last Rate: 75 mL/hr (02/01/18 3560)       PRN Meds:  ondansetron **OR** ondansetron  •  sodium chloride  •  Insert peripheral IV **AND** sodium chloride    Home Meds:  Prescriptions Prior to Admission   Medication Sig Dispense Refill Last Dose   • allopurinol (ZYLOPRIM) 100 MG tablet Take 100 mg by mouth Daily.      • amLODIPine (NORVASC) 10 MG tablet Take 10 mg by mouth Daily.      • aspirin 81 MG EC tablet Take 81 mg by mouth Daily.      • furosemide (LASIX) 20 MG tablet Take 20 mg by mouth Daily.      • levothyroxine (SYNTHROID, LEVOTHROID) 25 MCG tablet Take 25 mcg by mouth Daily.      •  metoprolol tartrate (LOPRESSOR) 50 MG tablet Take 50 mg by mouth Daily.      • Multiple Vitamins-Minerals (MULTIVITAMIN PO) Take 1 tablet by mouth Daily.      • pantoprazole (PROTONIX) 40 MG EC tablet Take 40 mg by mouth Daily.      • potassium chloride (K-DUR,KLOR-CON) 20 MEQ CR tablet Take 20 mEq by mouth 2 (Two) Times a Day.      • promethazine (PHENERGAN) 25 MG tablet Take 25 mg by mouth Every 6 (Six) Hours As Needed for Nausea or Vomiting.      • simvastatin (ZOCOR) 10 MG tablet Take 10 mg by mouth Every Night.      • terazosin (HYTRIN) 5 MG capsule Take 5 mg by mouth Every Night.      • warfarin (COUMADIN) 2.5 MG tablet Take 2.5 mg by mouth Daily.          ROS: Review of Systems   Constitutional: Positive for appetite change and fatigue. Negative for unexpected weight change.   HENT: Negative for trouble swallowing and voice change.         Hard of hearing    Eyes: Negative.    Respiratory: Negative.    Cardiovascular: Negative.    Gastrointestinal: Positive for abdominal pain. Negative for nausea and vomiting.   Endocrine: Negative.    Genitourinary: Negative.    Musculoskeletal: Negative.    Skin: Negative.    Neurological: Negative.    Hematological: Negative.    Psychiatric/Behavioral: Negative.        PAST MED HX:  Past Medical History:   Diagnosis Date   • A-fib    • CAD (coronary artery disease)    • Chronic anticoagulation    • Colon perforation    • Hard of hearing    • Hyperlipidemia    • Hypothyroid        PAST SURG HX:  Past Surgical History:   Procedure Laterality Date   • ABDOMINAL SURGERY     • CHOLECYSTECTOMY     • COLON RESECTION SMALL BOWEL     • HERNIA REPAIR     • KNEE SURGERY         FAM HX:  History reviewed. No pertinent family history.    SOC HX:  Social History     Social History   • Marital status:      Spouse name: N/A   • Number of children: N/A   • Years of education: N/A     Occupational History   • Not on file.     Social History Main Topics   • Smoking status: Never  "Smoker   • Smokeless tobacco: Not on file   • Alcohol use No   • Drug use: No   • Sexual activity: Not on file     Other Topics Concern   • Not on file     Social History Narrative   • No narrative on file       PHYSICAL EXAM  /95 (BP Location: Right arm, Patient Position: Lying)  Pulse 88  Temp 97.4 °F (36.3 °C) (Oral)   Resp 16  Ht 175.3 cm (69\")  Wt 77.9 kg (171 lb 12.8 oz)  SpO2 94%  BMI 25.37 kg/m2  Wt Readings from Last 3 Encounters:   02/01/18 77.9 kg (171 lb 12.8 oz)   ,body mass index is 25.37 kg/(m^2).  Physical Exam   Constitutional: He is oriented to person, place, and time. No distress.   Very pleasant to speak with.  Well nourished.  Appears slightly younger than stated age.     HENT:   Head: Normocephalic.   Eyes: Scleral icterus is present.   Neck: Normal range of motion.   Cardiovascular: Normal rate and regular rhythm.    Pulmonary/Chest: Effort normal. No respiratory distress.   Abdominal: Soft. Bowel sounds are normal.   Mildly tender to deep palpation of the right upper quadrant.  No guarding nor rebound.     Musculoskeletal: Normal range of motion.   Neurological: He is alert and oriented to person, place, and time.   Skin: Skin is warm and dry.   Psychiatric: He has a normal mood and affect. His behavior is normal.         Results Review:   I reviewed the patient's new clinical results.    Lab Results   Component Value Date    WBC 13.00 (H) 02/02/2018    HGB 13.9 02/02/2018    HGB 14.2 02/01/2018    HCT 42.3 02/02/2018    MCV 88.1 02/02/2018     02/02/2018       Lab Results   Component Value Date    INR 4.28 02/02/2018    INR 4.86 02/01/2018    INR 5.25 02/01/2018       Lab Results   Component Value Date    GLUCOSE 75 02/02/2018    BUN 23 02/02/2018    CREATININE 1.10 02/02/2018    EGFRIFNONA 63 02/02/2018    BCR 20.9 02/02/2018    CO2 28.0 02/02/2018    CALCIUM 8.4 (L) 02/02/2018    ALBUMIN 3.50 02/02/2018    AST 79 (H) 02/02/2018     (H) 02/02/2018     Bilirubin " 3.8.  Alk phos 584.    Lipase 80.    ASSESSMENTS/PLANS    1. Choledocholithiasis with obstruction   2. Elevated LFTs, secondary to above  3. Epigastric pain  4. Atrial fibrillation on chronic Coumadin therapy  5. Supratherapeutic INR     >>> INR remains supra therapeutic at 4.28.  Recommend reversal today with Vitamin K  >>> Recommend ERCP tomorrow pending INR level.    >>> Regular diet today. NPO at midnight.        I discussed the patients findings and my recommendations with patient    ELIZABETH Mayers  02/02/18  10:59 AM

## 2018-02-02 NOTE — PLAN OF CARE
Problem: Cholecystitis/Cholecystectomy (Adult)  Intervention: Adjust Diet to Patient Tolerance   02/02/18 1202   Adjust Diet to Patient Tolerance   Nutrition Interventions food preferences provided

## 2018-02-02 NOTE — PROGRESS NOTES
[facility] Medicine Services  PROGRESS NOTE    Patient Name: Tanner Douglas Jr.  : 1929  MRN: [id number]    Date of Admission: 2018  Length of Stay: 1  Primary Care Physician: Kelechi Calvert MD    Subjective   Subjective     CC:  F/U elevated LFT/choledocholithiasis    HPI:  No acute events O/N. Doing well and denies any abd pain at this time, but was having persistent epigastric pain prior to admission. No N/V, no pruritus, no bloating, or dark urine. No fever or chills.     Review of Systems  Otherwise ROS is negative except as mentioned in the HPI.    Objective   Objective     Vital Signs:   Temp:  [97.4 °F (36.3 °C)-98.2 °F (36.8 °C)] 98.2 °F (36.8 °C)  Heart Rate:  [73-95] 95  Resp:  [16] 16  BP: (121-145)/(79-98) 133/98        Physical Exam:  General Assessment: No acute cardiopulmonary distress. Well developed and well nourished.    HEENT: NCAT, PERRL, MM moist    Neck: Supple    CVS: Irreg rhythm, reg rate, S1S2 normal    Resp: CTAB, no adventitious sound    Abd: Soft, NT, ND, normal BS, no guarding or peritoneal signs    Ext: No edema, both calves are symmetric and NTTP    Neuro: No facial asymmetry, speech clear    Skin: W/D/I. No rash.    Psych: Affect is appropriate    Results Reviewed:  I have personally reviewed current lab, radiology, and data and agree.      Results from last 7 days  Lab Units 18  0618  2221 18  1711   WBC 10*3/mm3 13.00*  --  16.54*   HEMOGLOBIN g/dL 13.9  --  14.2   HEMATOCRIT % 42.3  --  43.2   PLATELETS 10*3/mm3 324  --  331   INR  4.28 4.86 5.25       Results from last 7 days  Lab Units 18  0627 18  1711   SODIUM mmol/L 138 136   POTASSIUM mmol/L 3.4* 4.6   CHLORIDE mmol/L 101 103   CO2 mmol/L 28.0 25.0   BUN mg/dL 23 22   CREATININE mg/dL 1.10 1.10   GLUCOSE mg/dL 75 106*   CALCIUM mg/dL 8.4* 8.9   ALT (SGPT) U/L 178* 225*   AST (SGOT) U/L 79* 154*     Estimated Creatinine Clearance: 51.1 mL/min (by  C-G formula based on Cr of 1.1).  No results found for: BNP  No results found for: PHART    Microbiology Results Abnormal     Procedure Component Value - Date/Time    Urine Culture - Urine, Urine, Clean Catch [[id number]]  (Normal) Collected:  02/01/18 1726    Lab Status:  Preliminary result Specimen:  Urine from Urine, Clean Catch Updated:  02/02/18 0831     Urine Culture No growth at 24 hours          Imaging Results (last 24 hours)     Procedure Component Value Units Date/Time    XR Chest 1 View [[id number]] Collected:  02/01/18 1718     Updated:  02/01/18 1743    Narrative:          EXAMINATION: XR CHEST 1 VW - 02/01/2018     INDICATION: Abdominal pain.     COMPARISON: None.     FINDINGS: Cardiac size enlarged with increased central pulmonary  vascularity. Ill-defined bibasilar pulmonary opacifications greatest on  the right. Minimal blunting of the lateral costophrenic sulci concerning  for trace pleural effusions. No pneumothorax.           Impression:       Cardiomegaly with increased central pulmonary vascularity  and ill-defined bibasilar pulmonary opacifications greatest on the  right. Findings are most consistent with probable trace bilateral  pleural effusions.     DICTATED:     02/01/2018  EDITED    :     02/01/2018      This report was finalized on 2/1/2018 5:41 PM by Dr. Alden Kent.       CT Abdomen Pelvis With Contrast [[id number]] Collected:  02/01/18 1656     Updated:  02/01/18 1910    Narrative:       EXAM:    CT Abdomen and Pelvis With Intravenous Contrast    CLINICAL HISTORY:    88 years old, male; Pain; Abdominal pain; Additional info: Abdominal pain,   unspecified    TECHNIQUE:    Axial computed tomography images of the abdomen and pelvis with intravenous   contrast.  All CT scans at this facility use one or more dose reduction   techniques, viz.: automated exposure control; ma/kV adjustment per patient size   (including targeted exams where dose is matched to indication; i.e. head); or    iterative reconstruction technique.    Coronal reformatted images were created and reviewed.    CONTRAST:    95 mL of isovue 300 administered intravenously.    COMPARISON:    No relevant prior studies available.    FINDINGS:    Lower thorax:  Bibasilar nonspecific lung base density is present, consistent   with dependent atelectasis.  There is pulmonary granulomatous scarring.  The   heart demonstrates diffuse enlargement.  A small hiatal hernia is present.     ABDOMEN:    Liver:  Unremarkable.    Gallbladder and bile ducts:  Asymptomatic bile duct dilatation is considered   normal after cholecystectomy. However there is question of a slightly   hyperdense filling defect in distal common bile duct which could be partially   calcified stone or soft tissue mass. Clinical correlation is recommended as to   whether the patient is symptomatic. MRCP would be helpful for further   evaluation.  There has been a cholecystectomy.  There is intrahepatic and   extrahepatic ductal dilatation. The distal common bile duct measures   approximately 1.3 x 1.2 CM.    Pancreas:  Unremarkable.  No mass.  No ductal dilation.    Spleen:  Unremarkable.  No splenomegaly.    Adrenals:  Unremarkable.  No mass.    Kidneys and ureters:  There are multiple simple left renal cysts.  There is   an extrarenal pelvis on the right.    Stomach and bowel:  Unremarkable.    Appendix:  A normal appendix is identified.     PELVIS:    Bladder:  The bladder is distended.    Reproductive:  Unremarkable as visualized.     ABDOMEN and PELVIS:    Intraperitoneal space:  Unremarkable.  No free air.  No significant fluid   collection.    Bones/joints:  There are degenerative changes of the spine.  No acute   fracture.  No dislocation.    Soft tissues:  Unremarkable.    Vasculature:  There is aortic ectasia.  There is extensive aortoiliac   atherosclerosis.  No abdominal aortic aneurysm.    Lymph nodes:  Unremarkable.  No enlarged lymph nodes.      Impression:          Biliary ductal dilatation.Asymptomatic bile duct dilatation is considered   normal after cholecystectomy. However there is question of a slightly   hyperdense filling defect in distal common bile duct which could be partially   calcified stone or soft tissue mass. Clinical correlation is recommended as to   whether the patient is symptomatic. MRCP would be helpful for further   evaluation.    THIS DOCUMENT HAS BEEN ELECTRONICALLY SIGNED BY LAKESHA VICENTE MD    MRI abdomen wo contrast mrcp [[id number]] Collected:  02/01/18 1928     Updated:  02/02/18 0207    Narrative:       EXAM:  MR Abdomen Without Intravenous Contrast, MRCP Protocol    EXAM DATE/TIME:  2/1/2018 7:28 PM    CLINICAL HISTORY:  88 years old, male; Elevated white blood cell count,   unspecified; Other disorders of bilirubin metabolism; Unspecified abdominal   pain; Abnormal coagulation profile; Other specified abnormal findings of blood   chemistry; Signs and symptoms; Patient HX: Abdominal pain; Possible biliary   obstruction elevated lft's and elevated bilirubin    TECHNIQUE:  Multiplanar magnetic resonance images of the abdomen without   intravenous contrast using MRCP protocol.    COMPARISON:  No relevant prior studies available.    FINDINGS:    Lower thorax:  Minimal posterior pleural effusions.    Bile ducts:  Choledocholithiasis with 10 mm diameter low signal downstream   common bile duct stone.  Dilated common bile duct and biliary tree consistent   with biliary obstruction.  Upstream common bile duct measures 14 mm in diameter.    Gallbladder:  Previous cholecystectomy.    Liver:  Unremarkable.    Pancreas:  Unremarkable.  No ductal dilation.    Spleen:  Spleen upper limits of normal in size.    Adrenals:  Unremarkable.  No mass.    Kidneys and ureters:  Small bilateral renal cysts left greater than right.    No hydronephrosis.    Stomach and bowel:  Unremarkable.  No obstruction.    Other findings:  Distended urinary bladder.       "Impression:       1.  Choledocholithiasis with 10 mm diameter low signal downstream common bile   duct stone.  2.  Dilated common bile duct and biliary tree consistent with biliary   obstruction.  3.  Previous cholecystectomy.    THIS DOCUMENT HAS BEEN ELECTRONICALLY SIGNED BY ARMANDO BYRD JR. MD             I have reviewed the medications.    Assessment/Plan   Assessment / Plan     Hospital Problem List     * (Principal)Elevated LFTs    Hyperbilirubinemia    Leukocytosis    Supratherapeutic INR    Pleural effusion, bilateral    A-fib    CAD (coronary artery disease)    Essential hypertension    Hyperlipidemia    Abdominal pain             Brief Hospital Course to date:  Tanner Douglas Jr. is a 88 y.o. male, relatively poor historian, has history of chronic Afib and chronically anticoagulated with Coumadin, HTN/HLD, and CAD, who presented with \"abnormal lab\"-elevated LFT, but pt is not reporting any pain/symptoms at the time of my evaluation. He has a history of CCY. MRCP revealed dilated CBD with a 10 mm stone.      Assessment & Plan:  - Pt had leukocytosis on presentation, but was afebrile with no RUQ, so doubt cholangitis. Leukocytosis is improving without any abx at this time, so probably reactive. Will cont to watch off abx for now.  - Appreciate GI eval, recommendation note, will give Vit K 5mg PO x 1 for mildly elevated INR/Coumadin held.   - Home med reviewed and reconciled  - Basic labs in am, hopefully get ERCP in am    DVT Prophylaxis:  Mechanical only    CODE STATUS: Full Code    Disposition: Home once medically stable, PT/CM consulted for evaluation.    Ren Jiménez MD  02/02/18  11:48 AM        "

## 2018-02-02 NOTE — PROGRESS NOTES
Discharge Planning Assessment  [facility]     Patient Name: Tanner Douglas Jr.  MRN: [id number]  Today's Date: 2/2/2018    Admit Date: 2/1/2018          Discharge Needs Assessment       02/02/18 1121    Living Environment    Lives With spouse    Living Arrangements house    Home Accessibility no concerns    Stair Railings at Home none    Type of Financial/Environmental Concern none    Transportation Available car;family or friend will provide    Living Environment    Provides Primary Care For no one    Primary Care Provided By spouse/significant other    Quality Of Family Relationships supportive    Able to Return to Prior Living Arrangements yes    Discharge Needs Assessment    Concerns To Be Addressed no discharge needs identified;denies needs/concerns at this time    Readmission Within The Last 30 Days no previous admission in last 30 days    Anticipated Changes Related to Illness none    Equipment Currently Used at Home none    Equipment Needed After Discharge none    Discharge Disposition home or self-care            Discharge Plan       02/02/18 1121    Case Management/Social Work Plan    Plan Home at discharge     Patient/Family In Agreement With Plan yes    Additional Comments Spoke with patient and family at bedside. Patient lives in [facility] with his spouse. He remains independent with his ADL's and at this time patient states he has no need for DME or home health. His goal is to return home with his spouse and has strong family support -  following - -0128         Discharge Placement     No information found                Demographic Summary       02/02/18 1120    Referral Information    Admission Type inpatient    Arrived From admitted as an inpatient    Referral Source admission list    Reason For Consult discharge planning    Record Reviewed history and physical;medical record    Contact Information    Permission Granted to Share Information With     Primary Care  Physician Information    Name Kelechi Calvert             Functional Status       02/02/18 1120    Functional Status Current    Current Functional Level Comment Please see nursing notes     Functional Status Prior    Ambulation 0-->independent    Transferring 0-->independent    Toileting 0-->independent    Bathing 0-->independent    Dressing 0-->independent    Eating 0-->independent    Communication 0-->understands/communicates without difficulty    Swallowing 0-->swallows foods/liquids without difficulty    IADL    Medications independent    Meal Preparation independent    Housekeeping assistive person    Laundry assistive person    Shopping assistive person    Oral Care independent    Activity Tolerance    Current Activity Limitations none    Usual Activity Tolerance good    Current Activity Tolerance moderate    Cognitive/Perceptual/Developmental    Current Mental Status/Cognitive Functioning no deficits noted            Psychosocial     None            Abuse/Neglect     None            Legal     None            Substance Abuse     None            Patient Forms     None          Sherine Cyr RN

## 2018-02-02 NOTE — H&P
"    [facility]  HISTORY AND PHYSICAL    Patient Name: Tanner Douglas Jr.  : 1929  MRN: [id number]  Primary Care Physician: Kelechi Calvert MD    Subjective   Subjective     Chief Complaint:  Abnormal labs    HPI:  Tanner Douglas Jr. is a 88 y.o. male that presents to the ED for evaluation of abnormal labs.  The patient states that for the past couple of months he has been experiencing intermittent epigastric abdominal pain.  Over the past week his pain has been constant and described as \"hurting\", currently he is pain free since yesterday.  He went to his primary care physician regarding his symptoms last week and was prescribed Pepcid which helped for 3 days before the pain returned.  He returned his primary care physician yesterday and labs were drawn.  His physician called him today as his LFTs were markedly abnormal, and he was advised to come to the ED for further evaluation and treatment. Currently the patient is asymptomatic.  He denies fever, chills, soa, cp, nausea, vomiting, diarrhea, abdominal pain or dysuria.  CT of the abdomen and pelvis showed biliary ductal dilatation with a slightly hyperdense filling defect in distal common bile duct which could be partially calcified stone or soft tissue mass.  Repeat labs showed elevated LFT's and elevated bilirubin.  Patient is being admitted to the Hospitalist for further evaluation and management.    87 YO MALE WHO PRESENTS FOR EVALUATION OF ABNORMAL LABS BUT TELLS ME THAT HE HAS NOT HAD PAIN.  DENIES F/C, ITCHING, JAUNDICE.  DENIES ABD SWELLING BUT FAMILY FEELS HE DOES HAVE MORE BLOATING.  HAS HAD CCY.  NOTES DARK URINE AND LIGHT STOOLS. ON COUMADIN FOR AFIB      Review of Systems   Constitutional: Negative.    HENT: Negative.    Eyes: Negative.    Respiratory: Negative.    Cardiovascular: Negative.    Gastrointestinal: Positive for abdominal pain (epigastric) and nausea. Negative for abdominal distention, " anal bleeding, blood in stool, constipation, diarrhea, rectal pain and vomiting.   Endocrine: Negative.    Genitourinary: Negative.    Musculoskeletal: Negative.    Skin: Negative.    Allergic/Immunologic: Negative.    Neurological: Negative.    Hematological: Negative.    Psychiatric/Behavioral: Negative.           Otherwise 10-system ROS reviewed and is negative except as mentioned in the HPI.    Personal History     Past Medical History:   Diagnosis Date   • A-fib    • CAD (coronary artery disease)    • Chronic anticoagulation    • Colon perforation    • Hard of hearing    • Hyperlipidemia    • Hypothyroid        Past Surgical History:   Procedure Laterality Date   • ABDOMINAL SURGERY     • CHOLECYSTECTOMY     • COLON RESECTION SMALL BOWEL     • HERNIA REPAIR     • KNEE SURGERY         Family History: family history is not on file. noncontributory given advanced age/presentation    Social History:  reports that he has never smoked. He does not have any smokeless tobacco history on file. He reports that he does not drink alcohol or use illicit drugs.  Social History     Social History Narrative   • No narrative on file       Medications:  Prescriptions Prior to Admission   Medication Sig Dispense Refill Last Dose   • allopurinol (ZYLOPRIM) 100 MG tablet Take 100 mg by mouth Daily.      • amLODIPine (NORVASC) 10 MG tablet Take 10 mg by mouth Daily.      • aspirin 81 MG EC tablet Take 81 mg by mouth Daily.      • furosemide (LASIX) 20 MG tablet Take 20 mg by mouth Daily.      • levothyroxine (SYNTHROID, LEVOTHROID) 25 MCG tablet Take 25 mcg by mouth Daily.      • metoprolol tartrate (LOPRESSOR) 50 MG tablet Take 50 mg by mouth Daily.      • Multiple Vitamins-Minerals (MULTIVITAMIN PO) Take 1 tablet by mouth Daily.      • pantoprazole (PROTONIX) 40 MG EC tablet Take 40 mg by mouth Daily.      • potassium chloride (K-DUR,KLOR-CON) 20 MEQ CR tablet Take 20 mEq by mouth 2 (Two) Times a Day.      • promethazine  (PHENERGAN) 25 MG tablet Take 25 mg by mouth Every 6 (Six) Hours As Needed for Nausea or Vomiting.      • simvastatin (ZOCOR) 10 MG tablet Take 10 mg by mouth Every Night.      • terazosin (HYTRIN) 5 MG capsule Take 5 mg by mouth Every Night.      • warfarin (COUMADIN) 2.5 MG tablet Take 2.5 mg by mouth Daily.          Allergies   Allergen Reactions   • Penicillins Swelling       Objective   Objective     Vital Signs:   Temp:  [97.6 °F (36.4 °C)] 97.6 °F (36.4 °C)  Heart Rate:  [80-92] 82  Resp:  [16] 16  BP: (125-145)/(81-97) 145/90        Physical Exam   Constitutional: He is oriented to person, place, and time. He appears well-developed and well-nourished. No distress.   HENT:   Head: Normocephalic.   Eyes: Pupils are equal, round, and reactive to light.   Sclera icteric   Neck: Normal range of motion. Neck supple. No JVD present. No thyromegaly present.   Cardiovascular: Normal rate, regular rhythm, normal heart sounds and intact distal pulses.  Exam reveals no gallop and no friction rub.    No murmur heard.  Pulmonary/Chest: Effort normal and breath sounds normal. No respiratory distress. He has no wheezes. He has no rales. He exhibits no tenderness.   Abdominal: Soft. Bowel sounds are normal. He exhibits no distension and no mass. There is no tenderness. There is no rebound and no guarding. No hernia.   Musculoskeletal: Normal range of motion. He exhibits no edema, tenderness or deformity.   Neurological: He is alert and oriented to person, place, and time. No cranial nerve deficit.   Skin: Skin is warm and dry. No rash noted. He is not diaphoretic. No erythema. No pallor.   Psychiatric: He has a normal mood and affect. His behavior is normal. Judgment and thought content normal.      GEN; ALERT, ORIENTED, NAD, Craig, PLEASANT  HEENT; PERRLA, EOMI, SCL ICTERUS  ABD; SOFT, +BS, DISTENDED BUT NONTENDER, NO HSM/MASS APPRECIATED  EXT; NO CCE, 2+ PULSES  SKIN; CDI, WARM, JAUNDICE FACE  NEURO; GROSSLY INTACT  PSYCH;  MOOD AND AFFECT APPROPRIATE    Results Reviewed:  I have personally reviewed current lab, radiology, and data and agree.      Results from last 7 days  Lab Units 02/01/18  1711   WBC 10*3/mm3 16.54*   HEMOGLOBIN g/dL 14.2   HEMATOCRIT % 43.2   PLATELETS 10*3/mm3 331   INR  5.25       Results from last 7 days  Lab Units 02/01/18  1711   SODIUM mmol/L 136   POTASSIUM mmol/L 4.6   CHLORIDE mmol/L 103   CO2 mmol/L 25.0   BUN mg/dL 22   CREATININE mg/dL 1.10   GLUCOSE mg/dL 106*   CALCIUM mg/dL 8.9   ALT (SGPT) U/L 225*   AST (SGOT) U/L 154*     Estimated Creatinine Clearance: 51.1 mL/min (by C-G formula based on Cr of 1.1).  Brief Urine Lab Results  (Last result in the past 365 days)      Color   Clarity   Blood   Leuk Est   Nitrite   Protein   CREAT   Urine HCG        02/01/18 1726 Orange(A) Cloudy(A) Negative Small (1+)(A) Negative 100 mg/dL (2+)(A)             No results found for: BNP  No results found for: PHART  Imaging Results (last 24 hours)     Procedure Component Value Units Date/Time    XR Chest 1 View [[id number]] Collected:  02/01/18 1718     Updated:  02/01/18 1743    Narrative:          EXAMINATION: XR CHEST 1 VW - 02/01/2018     INDICATION: Abdominal pain.     COMPARISON: None.     FINDINGS: Cardiac size enlarged with increased central pulmonary  vascularity. Ill-defined bibasilar pulmonary opacifications greatest on  the right. Minimal blunting of the lateral costophrenic sulci concerning  for trace pleural effusions. No pneumothorax.           Impression:       Cardiomegaly with increased central pulmonary vascularity  and ill-defined bibasilar pulmonary opacifications greatest on the  right. Findings are most consistent with probable trace bilateral  pleural effusions.     DICTATED:     02/01/2018  EDITED    :     02/01/2018      This report was finalized on 2/1/2018 5:41 PM by Dr. Alden Kent.       CT Abdomen Pelvis With Contrast [[id number]] Collected:  02/01/18 1656     Updated:  02/01/18 1910     Narrative:       EXAM:    CT Abdomen and Pelvis With Intravenous Contrast    CLINICAL HISTORY:    88 years old, male; Pain; Abdominal pain; Additional info: Abdominal pain,   unspecified    TECHNIQUE:    Axial computed tomography images of the abdomen and pelvis with intravenous   contrast.  All CT scans at this facility use one or more dose reduction   techniques, viz.: automated exposure control; ma/kV adjustment per patient size   (including targeted exams where dose is matched to indication; i.e. head); or   iterative reconstruction technique.    Coronal reformatted images were created and reviewed.    CONTRAST:    95 mL of isovue 300 administered intravenously.    COMPARISON:    No relevant prior studies available.    FINDINGS:    Lower thorax:  Bibasilar nonspecific lung base density is present, consistent   with dependent atelectasis.  There is pulmonary granulomatous scarring.  The   heart demonstrates diffuse enlargement.  A small hiatal hernia is present.     ABDOMEN:    Liver:  Unremarkable.    Gallbladder and bile ducts:  Asymptomatic bile duct dilatation is considered   normal after cholecystectomy. However there is question of a slightly   hyperdense filling defect in distal common bile duct which could be partially   calcified stone or soft tissue mass. Clinical correlation is recommended as to   whether the patient is symptomatic. MRCP would be helpful for further   evaluation.  There has been a cholecystectomy.  There is intrahepatic and   extrahepatic ductal dilatation. The distal common bile duct measures   approximately 1.3 x 1.2 CM.    Pancreas:  Unremarkable.  No mass.  No ductal dilation.    Spleen:  Unremarkable.  No splenomegaly.    Adrenals:  Unremarkable.  No mass.    Kidneys and ureters:  There are multiple simple left renal cysts.  There is   an extrarenal pelvis on the right.    Stomach and bowel:  Unremarkable.    Appendix:  A normal appendix is identified.     PELVIS:    Bladder:  The  bladder is distended.    Reproductive:  Unremarkable as visualized.     ABDOMEN and PELVIS:    Intraperitoneal space:  Unremarkable.  No free air.  No significant fluid   collection.    Bones/joints:  There are degenerative changes of the spine.  No acute   fracture.  No dislocation.    Soft tissues:  Unremarkable.    Vasculature:  There is aortic ectasia.  There is extensive aortoiliac   atherosclerosis.  No abdominal aortic aneurysm.    Lymph nodes:  Unremarkable.  No enlarged lymph nodes.      Impression:         Biliary ductal dilatation.Asymptomatic bile duct dilatation is considered   normal after cholecystectomy. However there is question of a slightly   hyperdense filling defect in distal common bile duct which could be partially   calcified stone or soft tissue mass. Clinical correlation is recommended as to   whether the patient is symptomatic. MRCP would be helpful for further   evaluation.    THIS DOCUMENT HAS BEEN ELECTRONICALLY SIGNED BY LAKESHA VICENTE MD             Assessment/Plan   Assessment / Plan     Hospital Problem List     * (Principal)Elevated LFTs    Hyperbilirubinemia    Leukocytosis    Supratherapeutic INR    Pleural effusion, bilateral    A-fib    CAD (coronary artery disease)    Essential hypertension    Hyperlipidemia    Abdominal pain            Assessment & Plan:    1.  Elevated LFT's W/ HYPERBILIRUBINEMIA S/P CCY W/ DISTAL CBD DEFECT; MRCP/GI  *CT of the abdomen and pelvis showed biliary ductal dilatation   -MRCP   -NPO after midnight   -consult GI in the am   -cbc, cmp, pt/inr in the am    2.  Hyperbilirubinemia   -Consult GI in the am   -MRCP    3.  afib with a supratherapeutic INR; ASSUME SUPRATHERAPEUTIC LEVELS FROM COUMADIN RATHER THAN FAILURE OF LIVER; HOLD COUMADIN FOR NOW.   -Hold anticoagulation for now   -daily pt/inr    4.  Trace bilateral pleural effusion; ASYMPTOMATIC ON ROOM AIR   -monitor    5.  Leukocytosis; UA/CXR NEGATIVE ESSENTIALLY.  NO PAIN IN RUQ.   -cbc in the  am and monitor    6.  CAD s/p stents    7.  Essential Hypertension   -vital signs q4 hours   -continue home meds    8.  Hyperlipidemia    DVT prophylaxis:  Teds/Scuds, Hold anticoagulation secondary to procedure/supratherapeutic INR    CODE STATUS:  Full Code    hSerine Kwongnington, APRN  02/01/18   9:49 PM  ALL OF THE ABOVE DISCUSSED W/ FAMILY AND PT AT BEDSIDE.  INPATIENT status due to the need for care which can only be reasonably provided in an hospital setting such as aggressive/expedited ancillary services and/or consultation services, the necessity for IV medications, close physician monitoring and/or the possible need for procedures.  In such, I feel patient’s risk for adverse outcomes and need for care warrant INPATIENT evaluation and predict the patient’s care encounter to likely last beyond 2 midnights.

## 2018-02-03 ENCOUNTER — APPOINTMENT (OUTPATIENT)
Dept: GENERAL RADIOLOGY | Facility: HOSPITAL | Age: 83
End: 2018-02-03

## 2018-02-03 ENCOUNTER — ANESTHESIA EVENT (OUTPATIENT)
Dept: GASTROENTEROLOGY | Facility: HOSPITAL | Age: 83
End: 2018-02-03

## 2018-02-03 ENCOUNTER — ANESTHESIA (OUTPATIENT)
Dept: GASTROENTEROLOGY | Facility: HOSPITAL | Age: 83
End: 2018-02-03

## 2018-02-03 LAB
ABO GROUP BLD: NORMAL
ABO GROUP BLD: NORMAL
ALBUMIN SERPL-MCNC: 3.1 G/DL (ref 3.2–4.8)
ALBUMIN/GLOB SERPL: 1.6 G/DL (ref 1.5–2.5)
ALP SERPL-CCNC: 554 U/L (ref 25–100)
ALT SERPL W P-5'-P-CCNC: 125 U/L (ref 7–40)
ANION GAP SERPL CALCULATED.3IONS-SCNC: 8 MMOL/L (ref 3–11)
AST SERPL-CCNC: 36 U/L (ref 0–33)
BACTERIA SPEC AEROBE CULT: NORMAL
BILIRUB SERPL-MCNC: 3.3 MG/DL (ref 0.3–1.2)
BLD GP AB SCN SERPL QL: NEGATIVE
BUN BLD-MCNC: 21 MG/DL (ref 9–23)
BUN/CREAT SERPL: 21 (ref 7–25)
CALCIUM SPEC-SCNC: 8 MG/DL (ref 8.7–10.4)
CHLORIDE SERPL-SCNC: 102 MMOL/L (ref 99–109)
CO2 SERPL-SCNC: 28 MMOL/L (ref 20–31)
CREAT BLD-MCNC: 1 MG/DL (ref 0.6–1.3)
GFR SERPL CREATININE-BSD FRML MDRD: 71 ML/MIN/1.73
GLOBULIN UR ELPH-MCNC: 2 GM/DL
GLUCOSE BLD-MCNC: 82 MG/DL (ref 70–100)
INR PPP: 1.5
POTASSIUM BLD-SCNC: 3.4 MMOL/L (ref 3.5–5.5)
PROT SERPL-MCNC: 5.1 G/DL (ref 5.7–8.2)
PROTHROMBIN TIME: 16.6 SECONDS (ref 9.6–11.5)
RH BLD: POSITIVE
RH BLD: POSITIVE
SODIUM BLD-SCNC: 138 MMOL/L (ref 132–146)
TSH SERPL DL<=0.05 MIU/L-ACNC: 6.11 MIU/ML (ref 0.35–5.35)

## 2018-02-03 PROCEDURE — 99233 SBSQ HOSP IP/OBS HIGH 50: CPT | Performed by: HOSPITALIST

## 2018-02-03 PROCEDURE — 25010000002 LEVOFLOXACIN PER 250 MG: Performed by: INTERNAL MEDICINE

## 2018-02-03 PROCEDURE — 86901 BLOOD TYPING SEROLOGIC RH(D): CPT | Performed by: INTERNAL MEDICINE

## 2018-02-03 PROCEDURE — 80053 COMPREHEN METABOLIC PANEL: CPT | Performed by: HOSPITALIST

## 2018-02-03 PROCEDURE — 25010000002 NEOSTIGMINE PER 0.5 MG: Performed by: ANESTHESIOLOGY

## 2018-02-03 PROCEDURE — 74330 X-RAY BILE/PANC ENDOSCOPY: CPT

## 2018-02-03 PROCEDURE — 25010000002 ONDANSETRON PER 1 MG: Performed by: NURSE PRACTITIONER

## 2018-02-03 PROCEDURE — 25010000002 PROPOFOL 10 MG/ML EMULSION: Performed by: ANESTHESIOLOGY

## 2018-02-03 PROCEDURE — BF10YZZ FLUOROSCOPY OF BILE DUCTS USING OTHER CONTRAST: ICD-10-PCS | Performed by: INTERNAL MEDICINE

## 2018-02-03 PROCEDURE — 0FC98ZZ EXTIRPATION OF MATTER FROM COMMON BILE DUCT, VIA NATURAL OR ARTIFICIAL OPENING ENDOSCOPIC: ICD-10-PCS | Performed by: INTERNAL MEDICINE

## 2018-02-03 PROCEDURE — 84443 ASSAY THYROID STIM HORMONE: CPT | Performed by: HOSPITALIST

## 2018-02-03 PROCEDURE — C1726 CATH, BAL DIL, NON-VASCULAR: HCPCS | Performed by: INTERNAL MEDICINE

## 2018-02-03 PROCEDURE — 85610 PROTHROMBIN TIME: CPT | Performed by: INTERNAL MEDICINE

## 2018-02-03 PROCEDURE — 25010000002 PHENYLEPHRINE PER 1 ML: Performed by: ANESTHESIOLOGY

## 2018-02-03 PROCEDURE — 86850 RBC ANTIBODY SCREEN: CPT | Performed by: INTERNAL MEDICINE

## 2018-02-03 PROCEDURE — C1769 GUIDE WIRE: HCPCS | Performed by: INTERNAL MEDICINE

## 2018-02-03 PROCEDURE — 86900 BLOOD TYPING SEROLOGIC ABO: CPT | Performed by: INTERNAL MEDICINE

## 2018-02-03 RX ORDER — PROPOFOL 10 MG/ML
VIAL (ML) INTRAVENOUS AS NEEDED
Status: DISCONTINUED | OUTPATIENT
Start: 2018-02-03 | End: 2018-02-03 | Stop reason: SURG

## 2018-02-03 RX ORDER — LIDOCAINE HYDROCHLORIDE 10 MG/ML
INJECTION, SOLUTION INFILTRATION; PERINEURAL AS NEEDED
Status: DISCONTINUED | OUTPATIENT
Start: 2018-02-03 | End: 2018-02-03 | Stop reason: SURG

## 2018-02-03 RX ORDER — FAMOTIDINE 20 MG/1
20 TABLET, FILM COATED ORAL ONCE
Status: DISCONTINUED | OUTPATIENT
Start: 2018-02-03 | End: 2018-02-03 | Stop reason: HOSPADM

## 2018-02-03 RX ORDER — FENTANYL CITRATE 50 UG/ML
50 INJECTION, SOLUTION INTRAMUSCULAR; INTRAVENOUS
Status: DISCONTINUED | OUTPATIENT
Start: 2018-02-03 | End: 2018-02-03 | Stop reason: HOSPADM

## 2018-02-03 RX ORDER — GLYCOPYRROLATE 0.2 MG/ML
INJECTION INTRAMUSCULAR; INTRAVENOUS AS NEEDED
Status: DISCONTINUED | OUTPATIENT
Start: 2018-02-03 | End: 2018-02-03 | Stop reason: SURG

## 2018-02-03 RX ORDER — LEVOFLOXACIN 5 MG/ML
500 INJECTION, SOLUTION INTRAVENOUS
Status: COMPLETED | OUTPATIENT
Start: 2018-02-03 | End: 2018-02-03

## 2018-02-03 RX ORDER — LIDOCAINE HYDROCHLORIDE 10 MG/ML
0.5 INJECTION, SOLUTION EPIDURAL; INFILTRATION; INTRACAUDAL; PERINEURAL ONCE AS NEEDED
Status: DISCONTINUED | OUTPATIENT
Start: 2018-02-03 | End: 2018-02-03 | Stop reason: HOSPADM

## 2018-02-03 RX ORDER — FAMOTIDINE 10 MG/ML
20 INJECTION, SOLUTION INTRAVENOUS ONCE
Status: DISCONTINUED | OUTPATIENT
Start: 2018-02-03 | End: 2018-02-03 | Stop reason: HOSPADM

## 2018-02-03 RX ORDER — SODIUM CHLORIDE, SODIUM LACTATE, POTASSIUM CHLORIDE, CALCIUM CHLORIDE 600; 310; 30; 20 MG/100ML; MG/100ML; MG/100ML; MG/100ML
9 INJECTION, SOLUTION INTRAVENOUS CONTINUOUS
Status: DISCONTINUED | OUTPATIENT
Start: 2018-02-03 | End: 2018-02-05 | Stop reason: HOSPADM

## 2018-02-03 RX ORDER — ATRACURIUM BESYLATE 10 MG/ML
INJECTION, SOLUTION INTRAVENOUS AS NEEDED
Status: DISCONTINUED | OUTPATIENT
Start: 2018-02-03 | End: 2018-02-03 | Stop reason: SURG

## 2018-02-03 RX ORDER — HYDROMORPHONE HYDROCHLORIDE 1 MG/ML
0.5 INJECTION, SOLUTION INTRAMUSCULAR; INTRAVENOUS; SUBCUTANEOUS
Status: DISCONTINUED | OUTPATIENT
Start: 2018-02-03 | End: 2018-02-03 | Stop reason: HOSPADM

## 2018-02-03 RX ORDER — SODIUM CHLORIDE 0.9 % (FLUSH) 0.9 %
1-10 SYRINGE (ML) INJECTION AS NEEDED
Status: DISCONTINUED | OUTPATIENT
Start: 2018-02-03 | End: 2018-02-03 | Stop reason: HOSPADM

## 2018-02-03 RX ADMIN — TERAZOSIN HYDROCHLORIDE ANHYDROUS 5 MG: 5 CAPSULE ORAL at 20:00

## 2018-02-03 RX ADMIN — SODIUM CHLORIDE, POTASSIUM CHLORIDE, SODIUM LACTATE AND CALCIUM CHLORIDE: 600; 310; 30; 20 INJECTION, SOLUTION INTRAVENOUS at 11:40

## 2018-02-03 RX ADMIN — POTASSIUM CHLORIDE 10 MEQ: 750 CAPSULE, EXTENDED RELEASE ORAL at 08:35

## 2018-02-03 RX ADMIN — SODIUM CHLORIDE 75 ML/HR: 9 INJECTION, SOLUTION INTRAVENOUS at 14:39

## 2018-02-03 RX ADMIN — PHENYLEPHRINE HYDROCHLORIDE 100 MCG: 10 INJECTION INTRAVENOUS at 12:56

## 2018-02-03 RX ADMIN — PROPOFOL 100 MG: 10 INJECTION, EMULSION INTRAVENOUS at 12:30

## 2018-02-03 RX ADMIN — PANTOPRAZOLE SODIUM 40 MG: 40 TABLET, DELAYED RELEASE ORAL at 05:18

## 2018-02-03 RX ADMIN — PHENYLEPHRINE HYDROCHLORIDE 100 MCG: 10 INJECTION INTRAVENOUS at 13:35

## 2018-02-03 RX ADMIN — ALLOPURINOL 100 MG: 100 TABLET ORAL at 08:35

## 2018-02-03 RX ADMIN — LEVOTHYROXINE SODIUM 25 MCG: 25 TABLET ORAL at 05:18

## 2018-02-03 RX ADMIN — GLYCOPYRROLATE 0.4 MG: 0.2 INJECTION, SOLUTION INTRAMUSCULAR; INTRAVENOUS at 13:40

## 2018-02-03 RX ADMIN — SODIUM CHLORIDE 75 ML/HR: 9 INJECTION, SOLUTION INTRAVENOUS at 19:58

## 2018-02-03 RX ADMIN — LEVOFLOXACIN 500 MG: 5 INJECTION, SOLUTION INTRAVENOUS at 12:49

## 2018-02-03 RX ADMIN — PHENYLEPHRINE HYDROCHLORIDE 100 MCG: 10 INJECTION INTRAVENOUS at 13:24

## 2018-02-03 RX ADMIN — Medication 3 MG: at 13:40

## 2018-02-03 RX ADMIN — METOPROLOL TARTRATE 50 MG: 50 TABLET ORAL at 08:35

## 2018-02-03 RX ADMIN — ATRACURIUM BESYLATE 40 MG: 10 INJECTION, SOLUTION INTRAVENOUS at 12:30

## 2018-02-03 RX ADMIN — LIDOCAINE HYDROCHLORIDE 100 MG: 10 INJECTION, SOLUTION INFILTRATION; PERINEURAL at 12:30

## 2018-02-03 RX ADMIN — ATRACURIUM BESYLATE 10 MG: 10 INJECTION, SOLUTION INTRAVENOUS at 12:45

## 2018-02-03 RX ADMIN — ASPIRIN 81 MG: 81 TABLET, COATED ORAL at 08:35

## 2018-02-03 RX ADMIN — ONDANSETRON 4 MG: 2 INJECTION INTRAMUSCULAR; INTRAVENOUS at 13:40

## 2018-02-03 RX ADMIN — FUROSEMIDE 20 MG: 20 TABLET ORAL at 08:35

## 2018-02-03 RX ADMIN — AMLODIPINE BESYLATE 10 MG: 10 TABLET ORAL at 08:35

## 2018-02-03 NOTE — ANESTHESIA PROCEDURE NOTES
Airway  Urgency: emergent    Date/Time: 2/3/2018 12:35 PM  End Time:2/3/2018 12:36 PM    General Information and Staff    Patient location during procedure: OR    Consent for Airway (if performed for an anesthetic, see related documentation for consents)  Patient identity confirmed: verbally with patient  Consent: No emergent situation.  Consent given by: patient      Indications and Patient Condition  Indications for airway management: airway protection    Preoxygenated: yes  MILS maintained throughout  Mask difficulty assessment: 1 - vent by mask    Final Airway Details  Final airway type: endotracheal airway      Successful airway: ETT  Cuffed: yes   Successful intubation technique: direct laryngoscopy  Facilitating devices/methods: anterior pressure/BURP  Endotracheal tube insertion site: oral  Blade: Prasad  Blade size: #4  ETT size: 7.5 mm  Cormack-Lehane Classification: grade IIb - view of arytenoids or posterior of glottis only  Placement verified by: chest auscultation and capnometry   Measured from: gums  ETT to gums (cm): 22  Number of attempts at approach: 1

## 2018-02-03 NOTE — ANESTHESIA PREPROCEDURE EVALUATION
Anesthesia Evaluation     Patient summary reviewed and Nursing notes reviewed   NPO Solid Status: > 8 hours  NPO Liquid Status: > 8 hours     Airway   Mallampati: II  TM distance: >3 FB  Neck ROM: full  no difficulty expected  Dental      Pulmonary    (-) COPD, asthma, shortness of breath, not a smoker  Cardiovascular     ECG reviewed    (+) hypertension, CAD, dysrhythmias Atrial Fib, hyperlipidemia    ROS comment: Atrial fibrillation  Left axis deviation    Neuro/Psych  (-) seizures, TIA, CVA  GI/Hepatic/Renal/Endo    (+)  hypothyroidism,   (-) liver disease, no renal disease, diabetes    Musculoskeletal     Abdominal    Substance History      OB/GYN          Other                                                Anesthesia Plan    ASA 3     general     intravenous induction   Anesthetic plan and risks discussed with patient.    Plan discussed with CRNA.

## 2018-02-03 NOTE — ANESTHESIA POSTPROCEDURE EVALUATION
Patient: Tanner Douglas Jr.    Procedure Summary     Date Anesthesia Start Anesthesia Stop Room / Location    02/03/18 1223   [facility] /  [facility]       Procedure Diagnosis Surgeon Provider    ENDOSCOPIC RETROGRADE CHOLANGIOPANCREATOGRAPHY (N/A ) Calculus of bile duct without cholecystitis with obstruction  (Calculus of bile duct without cholecystitis with obstruction [K80.51]) MD Adonay Friend MD          Anesthesia Type: No value filed.  Last vitals  BP   138/83 (02/03/18 1127)   107/79    1355   Temp   98 °F (36.7 °C) (02/03/18 1124)    97.1   1355   Pulse   83 (02/03/18 1124)    66    1355    Resp 16     1355   SpO2   96 % (02/03/18 1124)      94%  1355     Post Anesthesia Care and Evaluation    Patient location during evaluation: PACU  Patient participation: complete - patient participated  Level of consciousness: awake and alert  Pain score: 0  Pain management: adequate  Airway patency: patent  Anesthetic complications: No anesthetic complications  PONV Status: none  Cardiovascular status: hemodynamically stable and acceptable  Respiratory status: nonlabored ventilation, acceptable and nasal cannula  Hydration status: acceptable

## 2018-02-03 NOTE — BRIEF OP NOTE
ERCP finding:  Dilated duct with distal 11-14 mm stone. Biliary sphincterotomy and balloon papillary dilatation performed to 15 mm. Large stone retrieved.

## 2018-02-03 NOTE — ANESTHESIA PREPROCEDURE EVALUATION
Anesthesia Evaluation     Patient summary reviewed and Nursing notes reviewed   NPO Solid Status: > 8 hours  NPO Liquid Status: > 8 hours     Airway   Mallampati: II  TM distance: >3 FB  Neck ROM: full  possible difficult intubation  Dental      Pulmonary    (-) COPD, asthma, shortness of breath, recent URI, not a smoker, pulmonary embolism  Cardiovascular     ECG reviewed    (+) hypertension, CAD, cardiac stents more than 12 months ago dysrhythmias Atrial Fib, hyperlipidemia    ROS comment: A fib LAD    Neuro/Psych  (-) seizures, TIA, CVA  GI/Hepatic/Renal/Endo    (+)  hypothyroidism,   (-) liver disease, no renal disease, diabetes    Musculoskeletal     Abdominal    Substance History      OB/GYN          Other                                            Anesthesia Plan    ASA 3 - emergent     general     intravenous induction   Anesthetic plan and risks discussed with patient.    Plan discussed with CRNA.

## 2018-02-03 NOTE — PLAN OF CARE
Problem: Patient Care Overview (Adult)  Goal: Plan of Care Review  Outcome: Ongoing (interventions implemented as appropriate)   02/03/18 2666   Coping/Psychosocial Response Interventions   Plan Of Care Reviewed With patient   Patient Care Overview   Progress no change       Problem: Cholecystitis/Cholecystectomy (Adult)  Goal: Signs and Symptoms of Listed Potential Problems Will be Absent or Manageable (Cholecystitis/Cholecystectomy)  Outcome: Ongoing (interventions implemented as appropriate)

## 2018-02-03 NOTE — PROGRESS NOTES
[facility] Medicine Services  PROGRESS NOTE    Patient Name: Tanner Douglas Jr.  : 1929  MRN: [id number]    Date of Admission: 2018  Length of Stay: 2  Primary Care Physician: Kelechi Calvert MD    Subjective   Subjective     CC:  F/U choledocholithiasis    HPI:  No acute events O/N. Doing well this morning. Denies any pain or discomfort. No N/V. No fever or chills.    Review of Systems  Otherwise ROS is negative except as mentioned in the HPI.    Objective   Objective     Vital Signs:   Temp:  [97.6 °F (36.4 °C)-98.1 °F (36.7 °C)] 98 °F (36.7 °C)  Heart Rate:  [76-99] 83  Resp:  [16-18] 18  BP: (130-153)/(78-96) 138/83        Physical Exam:  General Assessment: No acute cardiopulmonary distress. Well developed and well nourished.     HEENT: NCAT, PERRL, MM moist     Neck: Supple     CVS: Irreg rhythm, reg rate, S1S2 normal     Resp: CTAB, no adventitious sound     Abd: Soft, NT, ND, normal BS, no guarding or peritoneal signs     Ext: No edema, both calves are symmetric and NTTP     Neuro: No facial asymmetry, speech clear     Skin: W/D/I. No rash.     Psych: Affect is appropriate    Results Reviewed:  I have personally reviewed current lab, radiology, and data and agree.      Results from last 7 days  Lab Units 18  0736 18  0618  1711   WBC 10*3/mm3  --  13.00*  --  16.54*   HEMOGLOBIN g/dL  --  13.9  --  14.2   HEMATOCRIT %  --  42.3  --  43.2   PLATELETS 10*3/mm3  --  324  --  331   INR  1.50 4.28 4.86 5.25       Results from last 7 days  Lab Units 18  0736 18  0627 18  1711   SODIUM mmol/L 138 138 136   POTASSIUM mmol/L 3.4* 3.4* 4.6   CHLORIDE mmol/L 102 101 103   CO2 mmol/L 28.0 28.0 25.0   BUN mg/dL 21 23 22   CREATININE mg/dL 1.00 1.10 1.10   GLUCOSE mg/dL 82 75 106*   CALCIUM mg/dL 8.0* 8.4* 8.9   ALT (SGPT) U/L 125* 178* 225*   AST (SGOT) U/L 36* 79* 154*     Estimated Creatinine Clearance: 56.3 mL/min (by C-G  "formula based on Cr of 1).  No results found for: BNP  No results found for: PHART    Microbiology Results Abnormal     Procedure Component Value - Date/Time    Urine Culture - Urine, Urine, Clean Catch [[id number]]  (Normal) Collected:  02/01/18 1726    Lab Status:  Final result Specimen:  Urine from Urine, Clean Catch Updated:  02/03/18 0804     Urine Culture No growth at 2 days          Imaging Results (last 24 hours)     ** No results found for the last 24 hours. **             I have reviewed the medications.    Assessment/Plan   Assessment / Plan     Hospital Problem List     * (Principal)Calculus of bile duct without cholecystitis with obstruction    Overview Signed 2/2/2018  6:03 PM by ELIZABETH Mayers     Added automatically from request for surgery [id number]         Elevated LFTs    Hyperbilirubinemia    Leukocytosis    Supratherapeutic INR    Pleural effusion, bilateral    A-fib    CAD (coronary artery disease)    Essential hypertension    Hyperlipidemia    Abdominal pain             Brief Hospital Course to date:  Tanner Douglas Jr. is a 88 y.o. male, relatively poor historian, has history of chronic Afib and chronically anticoagulated with Coumadin, HTN/HLD, and CAD, who presented with \"abnormal lab\"-elevated LFT, but pt is not reporting any pain/symptoms at the time of my evaluation. He has a history of CCY. MRCP revealed dilated CBD with a 10 mm stone.        Assessment & Plan:  - Pt had leukocytosis on presentation, but was afebrile with no RUQ, so doubt cholangitis. Leukocytosis is improving without any abx at this time, so probably reactive. Will cont to watch off abx for now.  - INR is down to 1.5, probably ok to go through with ERCP without need for FFP.  - Keep O/N to monitor for post-op complications.  - Would like to resume Coumadin tonight if OK with GI, will not bridge with Lovenox or Hep, d/w pt and family  - Basic labs in am     DVT Prophylaxis:  Mechanical only     CODE STATUS: Full " Code     Disposition: Home tomorrow if stable and cleared from GI.      Ren Jiménez MD  02/03/18  12:11 PM

## 2018-02-04 LAB
ABO + RH BLD: NORMAL
ABO + RH BLD: NORMAL
ALBUMIN SERPL-MCNC: 3.2 G/DL (ref 3.2–4.8)
ALBUMIN/GLOB SERPL: 1.5 G/DL (ref 1.5–2.5)
ALP SERPL-CCNC: 450 U/L (ref 25–100)
ALT SERPL W P-5'-P-CCNC: 92 U/L (ref 7–40)
ANION GAP SERPL CALCULATED.3IONS-SCNC: 8 MMOL/L (ref 3–11)
AST SERPL-CCNC: 29 U/L (ref 0–33)
BASOPHILS # BLD AUTO: 0.02 10*3/MM3 (ref 0–0.2)
BASOPHILS NFR BLD AUTO: 0.2 % (ref 0–1)
BH BB BLOOD EXPIRATION DATE: NORMAL
BH BB BLOOD EXPIRATION DATE: NORMAL
BH BB BLOOD TYPE BARCODE: 5100
BH BB BLOOD TYPE BARCODE: 5100
BH BB DISPENSE STATUS: NORMAL
BH BB DISPENSE STATUS: NORMAL
BH BB PRODUCT CODE: NORMAL
BH BB PRODUCT CODE: NORMAL
BH BB UNIT NUMBER: NORMAL
BH BB UNIT NUMBER: NORMAL
BILIRUB SERPL-MCNC: 2.3 MG/DL (ref 0.3–1.2)
BUN BLD-MCNC: 16 MG/DL (ref 9–23)
BUN/CREAT SERPL: 17.8 (ref 7–25)
CALCIUM SPEC-SCNC: 8.2 MG/DL (ref 8.7–10.4)
CHLORIDE SERPL-SCNC: 103 MMOL/L (ref 99–109)
CO2 SERPL-SCNC: 27 MMOL/L (ref 20–31)
CREAT BLD-MCNC: 0.9 MG/DL (ref 0.6–1.3)
DEPRECATED RDW RBC AUTO: 48.1 FL (ref 37–54)
EOSINOPHIL # BLD AUTO: 0.2 10*3/MM3 (ref 0–0.3)
EOSINOPHIL NFR BLD AUTO: 2.1 % (ref 0–3)
ERYTHROCYTE [DISTWIDTH] IN BLOOD BY AUTOMATED COUNT: 14.9 % (ref 11.3–14.5)
GFR SERPL CREATININE-BSD FRML MDRD: 80 ML/MIN/1.73
GLOBULIN UR ELPH-MCNC: 2.2 GM/DL
GLUCOSE BLD-MCNC: 80 MG/DL (ref 70–100)
HCT VFR BLD AUTO: 41 % (ref 38.9–50.9)
HGB BLD-MCNC: 13.4 G/DL (ref 13.1–17.5)
IMM GRANULOCYTES # BLD: 0.05 10*3/MM3 (ref 0–0.03)
IMM GRANULOCYTES NFR BLD: 0.5 % (ref 0–0.6)
INR PPP: 1.2
LIPASE SERPL-CCNC: 69 U/L (ref 6–51)
LYMPHOCYTES # BLD AUTO: 0.82 10*3/MM3 (ref 0.6–4.8)
LYMPHOCYTES NFR BLD AUTO: 8.7 % (ref 24–44)
MCH RBC QN AUTO: 28.8 PG (ref 27–31)
MCHC RBC AUTO-ENTMCNC: 32.7 G/DL (ref 32–36)
MCV RBC AUTO: 88.2 FL (ref 80–99)
MONOCYTES # BLD AUTO: 0.79 10*3/MM3 (ref 0–1)
MONOCYTES NFR BLD AUTO: 8.4 % (ref 0–12)
NEUTROPHILS # BLD AUTO: 7.58 10*3/MM3 (ref 1.5–8.3)
NEUTROPHILS NFR BLD AUTO: 80.1 % (ref 41–71)
PLATELET # BLD AUTO: 283 10*3/MM3 (ref 150–450)
PMV BLD AUTO: 10.9 FL (ref 6–12)
POTASSIUM BLD-SCNC: 3.1 MMOL/L (ref 3.5–5.5)
PROT SERPL-MCNC: 5.4 G/DL (ref 5.7–8.2)
PROTHROMBIN TIME: 13.1 SECONDS (ref 9.6–11.5)
RBC # BLD AUTO: 4.65 10*6/MM3 (ref 4.2–5.76)
SODIUM BLD-SCNC: 138 MMOL/L (ref 132–146)
UNIT  ABO: NORMAL
UNIT  ABO: NORMAL
UNIT  RH: NORMAL
UNIT  RH: NORMAL
WBC NRBC COR # BLD: 9.46 10*3/MM3 (ref 3.5–10.8)

## 2018-02-04 PROCEDURE — 85025 COMPLETE CBC W/AUTO DIFF WBC: CPT | Performed by: HOSPITALIST

## 2018-02-04 PROCEDURE — 25010000002 ENOXAPARIN PER 10 MG: Performed by: HOSPITALIST

## 2018-02-04 PROCEDURE — 80053 COMPREHEN METABOLIC PANEL: CPT | Performed by: HOSPITALIST

## 2018-02-04 PROCEDURE — 83690 ASSAY OF LIPASE: CPT | Performed by: HOSPITALIST

## 2018-02-04 PROCEDURE — 99233 SBSQ HOSP IP/OBS HIGH 50: CPT | Performed by: HOSPITALIST

## 2018-02-04 PROCEDURE — 25010000002 LEVOFLOXACIN PER 250 MG: Performed by: HOSPITALIST

## 2018-02-04 PROCEDURE — 99232 SBSQ HOSP IP/OBS MODERATE 35: CPT | Performed by: INTERNAL MEDICINE

## 2018-02-04 PROCEDURE — 85610 PROTHROMBIN TIME: CPT | Performed by: NURSE PRACTITIONER

## 2018-02-04 RX ORDER — LEVOFLOXACIN 5 MG/ML
500 INJECTION, SOLUTION INTRAVENOUS
Status: DISCONTINUED | OUTPATIENT
Start: 2018-02-04 | End: 2018-02-05 | Stop reason: HOSPADM

## 2018-02-04 RX ORDER — WARFARIN SODIUM 2.5 MG/1
2.5 TABLET ORAL
Status: DISCONTINUED | OUTPATIENT
Start: 2018-02-04 | End: 2018-02-05

## 2018-02-04 RX ORDER — POTASSIUM CHLORIDE 750 MG/1
40 CAPSULE, EXTENDED RELEASE ORAL ONCE
Status: COMPLETED | OUTPATIENT
Start: 2018-02-04 | End: 2018-02-04

## 2018-02-04 RX ADMIN — LEVOTHYROXINE SODIUM 25 MCG: 25 TABLET ORAL at 05:28

## 2018-02-04 RX ADMIN — METOPROLOL TARTRATE 50 MG: 50 TABLET ORAL at 09:06

## 2018-02-04 RX ADMIN — ENOXAPARIN SODIUM 80 MG: 80 INJECTION SUBCUTANEOUS at 12:22

## 2018-02-04 RX ADMIN — ALLOPURINOL 100 MG: 100 TABLET ORAL at 09:06

## 2018-02-04 RX ADMIN — FUROSEMIDE 20 MG: 20 TABLET ORAL at 09:06

## 2018-02-04 RX ADMIN — POTASSIUM CHLORIDE 40 MEQ: 750 CAPSULE, EXTENDED RELEASE ORAL at 09:07

## 2018-02-04 RX ADMIN — WARFARIN SODIUM 2.5 MG: 2.5 TABLET ORAL at 17:50

## 2018-02-04 RX ADMIN — POTASSIUM CHLORIDE 10 MEQ: 750 CAPSULE, EXTENDED RELEASE ORAL at 09:06

## 2018-02-04 RX ADMIN — AMLODIPINE BESYLATE 10 MG: 10 TABLET ORAL at 09:06

## 2018-02-04 RX ADMIN — ASPIRIN 81 MG: 81 TABLET, COATED ORAL at 09:06

## 2018-02-04 RX ADMIN — LEVOFLOXACIN 500 MG: 5 INJECTION, SOLUTION INTRAVENOUS at 12:22

## 2018-02-04 RX ADMIN — ENOXAPARIN SODIUM 80 MG: 80 INJECTION SUBCUTANEOUS at 22:22

## 2018-02-04 RX ADMIN — SODIUM CHLORIDE 75 ML/HR: 9 INJECTION, SOLUTION INTRAVENOUS at 09:07

## 2018-02-04 RX ADMIN — TERAZOSIN HYDROCHLORIDE ANHYDROUS 5 MG: 5 CAPSULE ORAL at 22:22

## 2018-02-04 RX ADMIN — PANTOPRAZOLE SODIUM 40 MG: 40 TABLET, DELAYED RELEASE ORAL at 05:28

## 2018-02-04 NOTE — PROGRESS NOTES
[facility] Medicine Services  PROGRESS NOTE    Patient Name: Tanner Douglas Jr.  : 1929  MRN: [id number]    Date of Admission: 2018  Length of Stay: 3  Primary Care Physician: Kelechi Calvert MD    Subjective   Subjective     CC:  F/U choledocholithiasis    HPI:  No acute events O/N. Doing well and denies any abd pain, nausea, vomiting, diarrhea, hematochezia, melena, or fever/chills. No CP or SOA.    Review of Systems  Otherwise ROS is negative except as mentioned in the HPI.    Objective   Objective     Vital Signs:   Temp:  [97.1 °F (36.2 °C)-98.4 °F (36.9 °C)] 98.1 °F (36.7 °C)  Heart Rate:  [55-98] 98  Resp:  [14-18] 16  BP: (107-148)/(73-97) 147/97        Physical Exam:  General Assessment: No acute cardiopulmonary distress. Well developed and well nourished.      HEENT: NCAT, PERRL, MM moist      Neck: Supple      CVS: Irreg rhythm, reg rate, S1S2 normal      Resp: CTAB, no adventitious sound      Abd: Soft, NT, ND, normal BS, no guarding or peritoneal signs      Ext: No edema, both calves are symmetric and NTTP      Neuro: No facial asymmetry, speech clear      Skin: W/D/I. + ecchymosis on LUE.      Psych: Affect is appropriate    Results Reviewed:  I have personally reviewed current lab, radiology, and data and agree.      Results from last 7 days  Lab Units 18  0651 18  0736 18  0618  1711   WBC 10*3/mm3 9.46  --  13.00*  --  16.54*   HEMOGLOBIN g/dL 13.4  --  13.9  --  14.2   HEMATOCRIT % 41.0  --  42.3  --  43.2   PLATELETS 10*3/mm3 283  --  324  --  331   INR  1.20 1.50 4.28  < > 5.25   < > = values in this interval not displayed.    Results from last 7 days  Lab Units 18  0651 18  0736 18  06   SODIUM mmol/L 138 138 138   POTASSIUM mmol/L 3.1* 3.4* 3.4*   CHLORIDE mmol/L 103 102 101   CO2 mmol/L 27.0 28.0 28.0   BUN mg/dL 16 21 23   CREATININE mg/dL 0.90 1.00 1.10   GLUCOSE mg/dL 80 82 75   CALCIUM mg/dL 8.2*  "8.0* 8.4*   ALT (SGPT) U/L 92* 125* 178*   AST (SGOT) U/L 29 36* 79*     Estimated Creatinine Clearance: 62.5 mL/min (by C-G formula based on Cr of 0.9).  No results found for: BNP  No results found for: PHART    Microbiology Results Abnormal     Procedure Component Value - Date/Time    Urine Culture - Urine, Urine, Clean Catch [[id number]]  (Normal) Collected:  02/01/18 1726    Lab Status:  Final result Specimen:  Urine from Urine, Clean Catch Updated:  02/03/18 0804     Urine Culture No growth at 2 days          Imaging Results (last 24 hours)     Procedure Component Value Units Date/Time    FL ERCP pancreatic and biliary ducts [[id number]] Updated:  02/03/18 5996             I have reviewed the medications.    Assessment/Plan   Assessment / Plan     Hospital Problem List     * (Principal)Calculus of bile duct without cholecystitis with obstruction    Overview Signed 2/2/2018  6:03 PM by ELIZABETH Mayers     Added automatically from request for surgery [id number]         Elevated LFTs    Hyperbilirubinemia    Leukocytosis    Supratherapeutic INR    Pleural effusion, bilateral    A-fib    CAD (coronary artery disease)    Essential hypertension    Hyperlipidemia    Abdominal pain             Brief Hospital Course to date:  Tanner Douglas Jr. is a 88 y.o. male, relatively poor historian, has history of chronic Afib and chronically anticoagulated with Coumadin, HTN/HLD, and CAD, who presented with \"abnormal lab\"-elevated LFT, but pt is not reporting any pain/symptoms at the time of my evaluation. He has a history of CCY. MRCP revealed dilated CBD with a 10 mm stone.          Assessment & Plan:  - Pt had leukocytosis on presentation, resolved now. He was afebrile/nontoxic with no RUQ, so doubt cholangitis. He received Levaquin pre-operatively, but was recommended to cont for 5 days by GI (stop date in Epic). No post op complications noted, H/H stable. LFT trending down, but not significant, so will need to cont to " monitor.  - INR is subtherapeutic from reversal with Vit K for ERCP, no signs of any post op bleeding, will resume Coumadin with [facility] bridge, pharm to manage  - Good urine output and tolerating PO, will DC IVF.      DVT Prophylaxis:  Resume Coumadin tonight      CODE STATUS: Full Code      Disposition: Home once medically stable    Ren Jiménez MD  02/04/18  9:49 AM

## 2018-02-04 NOTE — PROGRESS NOTES
"GI Daily Progress Note  Subjective     Tushar Douglas Jr. is a 88 y.o. male who was admitted with Calculus of bile duct without cholecystitis with obstruction.   Feels well.  No abd pain.  Tolerating diet    Objective     /73 (BP Location: Left arm, Patient Position: Lying)  Pulse 68  Temp 98.3 °F (36.8 °C) (Oral)   Resp 18  Ht 175.3 cm (69\")  Wt 77.9 kg (171 lb 12.8 oz)  SpO2 96%  BMI 25.37 kg/m2    Intake/Output last 3 shifts:  I/O last 3 completed shifts:  In: 900 [I.V.:900]  Out: 2300 [Urine:2300]  Intake/Output this shift:  I/O this shift:  In: 2490 [P.O.:940; I.V.:1450; IV Piggyback:100]  Out: 1400 [Urine:1400]      Physical Exam  Wt Readings from Last 3 Encounters:   02/01/18 77.9 kg (171 lb 12.8 oz)   ,body mass index is 25.37 kg/(m^2).,@FLOWAMB(6)@,@FLOWAMB(5)@,@FLOWAMB(8)@   CONSTITUTIONAL:  Resp CTA; no rhonchi, rales, or wheezes.  Respiration effort normal  CV RRR; no M/R/G. No lower extremity edema  GI Abd soft, NT, ND, normal active bowel sounds.    Psych:     DATA:Results for TUSHAR DOUGLAS JR. (MRN [id number]) as of 2/4/2018 15:04   Ref. Range 2/1/2018 17:11 2/2/2018 06:27 2/4/2018 06:51   WBC Latest Ref Range: 3.50 - 10.80 10*3/mm3 16.54 (H) 13.00 (H) 9.46   RBC Latest Ref Range: 4.20 - 5.76 10*6/mm3 4.91 4.80 4.65   Hemoglobin Latest Ref Range: 13.1 - 17.5 g/dL 14.2 13.9 13.4   Hematocrit Latest Ref Range: 38.9 - 50.9 % 43.2 42.3 41.0   Results for TUSHAR DOUGLAS JR. (MRN [id number]) as of 2/4/2018 15:04   Ref. Range 2/1/2018 17:11 2/2/2018 06:27 2/3/2018 07:36 2/4/2018 06:51   Total Protein Latest Ref Range: 5.7 - 8.2 g/dL 6.2 5.9 5.1 (L) 5.4 (L)   ALT (SGPT) Latest Ref Range: 7 - 40 U/L 225 (H) 178 (H) 125 (H) 92 (H)   AST (SGOT) Latest Ref Range: 0 - 33 U/L 154 (H) 79 (H) 36 (H) 29   Total Bilirubin Latest Ref Range: 0.3 - 1.2 mg/dL 5.6 (H) 3.8 (H) 3.3 (H) 2.3 (H)       Assessment/Plan        1. Choledocholithiasis with obstruction   2. Elevated LFTs, secondary to above  3. " Epigastric pain  4. Atrial fibrillation on chronic Coumadin therapy    Doing well post ERCP with CBD stone removal.  LFT's improving. Starting back on coumadin today.  OK for DC in am if stable.  Needs repeat liver panel in 2 weeks by PCP.  Instructed to watch stools for signs of bleeding      Principal Problem:    Calculus of bile duct without cholecystitis with obstruction  Active Problems:    Elevated LFTs    Hyperbilirubinemia    Leukocytosis    Supratherapeutic INR    Pleural effusion, bilateral    A-fib    CAD (coronary artery disease)    Essential hypertension    Hyperlipidemia    Abdominal pain       LOS: 3 days     Agustin Stein MD  02/04/18  3:17 PM

## 2018-02-04 NOTE — PLAN OF CARE
Problem: Patient Care Overview (Adult)  Goal: Plan of Care Review  Outcome: Ongoing (interventions implemented as appropriate)   02/04/18 1511   Coping/Psychosocial Response Interventions   Plan Of Care Reviewed With patient   Patient Care Overview   Progress improving   Outcome Evaluation   Outcome Summary/Follow up Plan s/p ERCP, pt denies pain, nausea, or other complaints. Chronic Afib. Lovenox started today, & will resume coumadin as ordered.      Goal: Adult Individualization and Mutuality  Outcome: Ongoing (interventions implemented as appropriate)    Goal: Discharge Needs Assessment  Outcome: Ongoing (interventions implemented as appropriate)   02/04/18 1511   Discharge Needs Assessment   Concerns To Be Addressed denies needs/concerns at this time   Readmission Within The Last 30 Days no previous admission in last 30 days   Equipment Needed After Discharge none   Discharge Facility/Level Of Care Needs other (see comments)  (home independently, lives with wife)   Discharge Disposition still a patient   Current Health   Anticipated Changes Related to Illness none   Self-Care   Equipment Currently Used at Home none   Living Environment   Transportation Available car;family or friend will provide

## 2018-02-04 NOTE — PLAN OF CARE
Problem: Cholecystitis/Cholecystectomy (Adult)  Goal: Signs and Symptoms of Listed Potential Problems Will be Absent or Manageable (Cholecystitis/Cholecystectomy)  Outcome: Ongoing (interventions implemented as appropriate)   02/04/18 0343   Cholecystitis/Cholecystectomy   Problems Present (Cholecystitis/Cholecystectomy) none

## 2018-02-04 NOTE — PROGRESS NOTES
"Pharmacy Consult  -  Warfarin    Tanner Douglas Jr. is a  88 y.o. male   Height - 175.3 cm (69\")  Weight - 77.9 kg (171 lb 12.8 oz)    Consulting Provider: - MD Tino  Indication: - AFib  Goal INR: - 2-3  Home Regimen:   - Warfarin 2.5mg daily     Bridge Therapy: Enoxaparin 1mg/kg q12h     Drug-Drug Interactions with current regimen:   Aspirin - increased bleeding risk              Levofloxacin - increased INR              Pantoprazole - may increase INR    Warfarin Dosing During Admission:    Date  2/1 2/2 2/3 2/4        INR  5.25 4.28 1.5 1.2        Dose  0 0  Vitamin K 5mg 0 2.5mg             Education Provided:      Labs:  Results from last 7 days   Lab Units 02/04/18  0651 02/03/18  0736 02/02/18  0627 02/01/18  2221 02/01/18  1711   INR  1.20 1.50 4.28 4.86 5.25   HEMOGLOBIN g/dL 13.4 --  13.9 --  14.2   HEMATOCRIT % 41.0 --  42.3 --  43.2   PLATELETS 10*3/mm3 283 --  324 --  331   Results from last 7 days   Lab Units 02/04/18  0651 02/03/18  0736 02/02/18  0627   SODIUM mmol/L 138 138 138   POTASSIUM mmol/L 3.1* 3.4* 3.4*   CHLORIDE mmol/L 103 102 101   CO2 mmol/L 27.0 28.0 28.0   BUN mg/dL 16 21 23   CREATININE mg/dL 0.90 1.00 1.10   CALCIUM mg/dL 8.2* 8.0* 8.4*   BILIRUBIN mg/dL 2.3* 3.3* 3.8*   ALK PHOS U/L 450* 554* 584*   ALT (SGPT) U/L 92* 125* 178*   AST (SGOT) U/L 29 36* 79*   GLUCOSE mg/dL 80 82 75       Current dietary intake:   Diet Order   Procedures   • Diet Regular     Assessment/Plan:     To resume warfarin therapy  Home regimen: Warfarin 2.5mg daily  2/1 INR - 5.25 at admission  2/2 Vitamin K 5mg administered prior to ERCP (expect vitamin K effect 5-7 days)    Will restart Warfarin 2.5mg daily  Enoxaparin bridge until INR>2  Monitor s/sx bleeding and drug-drug interactions  Follow daily PT/INR and adjust dose accordingly     Thank you  [facility]  2/4/2018  10:38 AM       "

## 2018-02-05 VITALS
WEIGHT: 171.8 LBS | TEMPERATURE: 98.2 F | HEART RATE: 104 BPM | DIASTOLIC BLOOD PRESSURE: 97 MMHG | SYSTOLIC BLOOD PRESSURE: 155 MMHG | HEIGHT: 69 IN | BODY MASS INDEX: 25.45 KG/M2 | OXYGEN SATURATION: 95 % | RESPIRATION RATE: 16 BRPM

## 2018-02-05 LAB
ALBUMIN SERPL-MCNC: 3.5 G/DL (ref 3.2–4.8)
ALBUMIN/GLOB SERPL: 1.3 G/DL (ref 1.5–2.5)
ALP SERPL-CCNC: 426 U/L (ref 25–100)
ALT SERPL W P-5'-P-CCNC: 82 U/L (ref 7–40)
ANION GAP SERPL CALCULATED.3IONS-SCNC: 9 MMOL/L (ref 3–11)
AST SERPL-CCNC: 35 U/L (ref 0–33)
BILIRUB SERPL-MCNC: 2.1 MG/DL (ref 0.3–1.2)
BUN BLD-MCNC: 15 MG/DL (ref 9–23)
BUN/CREAT SERPL: 15 (ref 7–25)
CALCIUM SPEC-SCNC: 8.8 MG/DL (ref 8.7–10.4)
CHLORIDE SERPL-SCNC: 103 MMOL/L (ref 99–109)
CO2 SERPL-SCNC: 27 MMOL/L (ref 20–31)
CREAT BLD-MCNC: 1 MG/DL (ref 0.6–1.3)
GFR SERPL CREATININE-BSD FRML MDRD: 71 ML/MIN/1.73
GLOBULIN UR ELPH-MCNC: 2.6 GM/DL
GLUCOSE BLD-MCNC: 90 MG/DL (ref 70–100)
INR PPP: 1.2
POTASSIUM BLD-SCNC: 3 MMOL/L (ref 3.5–5.5)
PROT SERPL-MCNC: 6.1 G/DL (ref 5.7–8.2)
PROTHROMBIN TIME: 13.2 SECONDS (ref 9.6–11.5)
SODIUM BLD-SCNC: 139 MMOL/L (ref 132–146)

## 2018-02-05 PROCEDURE — 85610 PROTHROMBIN TIME: CPT | Performed by: NURSE PRACTITIONER

## 2018-02-05 PROCEDURE — 99239 HOSP IP/OBS DSCHRG MGMT >30: CPT | Performed by: HOSPITALIST

## 2018-02-05 PROCEDURE — 99231 SBSQ HOSP IP/OBS SF/LOW 25: CPT | Performed by: PHYSICIAN ASSISTANT

## 2018-02-05 PROCEDURE — 25010000002 LEVOFLOXACIN PER 250 MG: Performed by: HOSPITALIST

## 2018-02-05 PROCEDURE — 80053 COMPREHEN METABOLIC PANEL: CPT | Performed by: HOSPITALIST

## 2018-02-05 PROCEDURE — 25010000002 ENOXAPARIN PER 10 MG: Performed by: HOSPITALIST

## 2018-02-05 RX ORDER — WARFARIN SODIUM 5 MG/1
5 TABLET ORAL
Status: DISCONTINUED | OUTPATIENT
Start: 2018-02-05 | End: 2018-02-05 | Stop reason: HOSPADM

## 2018-02-05 RX ORDER — LEVOFLOXACIN 5 MG/ML
500 INJECTION, SOLUTION INTRAVENOUS
Qty: 300 ML | Refills: 0 | Status: SHIPPED | OUTPATIENT
Start: 2018-02-05 | End: 2018-02-05 | Stop reason: HOSPADM

## 2018-02-05 RX ORDER — LEVOFLOXACIN 500 MG/1
500 TABLET, FILM COATED ORAL DAILY
Qty: 3 TABLET | Refills: 0 | Status: SHIPPED | OUTPATIENT
Start: 2018-02-05 | End: 2018-02-08

## 2018-02-05 RX ORDER — WARFARIN SODIUM 2.5 MG/1
2.5 TABLET ORAL
Status: DISCONTINUED | OUTPATIENT
Start: 2018-02-06 | End: 2018-02-05 | Stop reason: HOSPADM

## 2018-02-05 RX ADMIN — LEVOTHYROXINE SODIUM 25 MCG: 25 TABLET ORAL at 05:23

## 2018-02-05 RX ADMIN — PANTOPRAZOLE SODIUM 40 MG: 40 TABLET, DELAYED RELEASE ORAL at 05:23

## 2018-02-05 RX ADMIN — LEVOFLOXACIN 500 MG: 5 INJECTION, SOLUTION INTRAVENOUS at 09:27

## 2018-02-05 RX ADMIN — METOPROLOL TARTRATE 50 MG: 50 TABLET ORAL at 09:25

## 2018-02-05 RX ADMIN — ASPIRIN 81 MG: 81 TABLET, COATED ORAL at 09:25

## 2018-02-05 RX ADMIN — ALLOPURINOL 100 MG: 100 TABLET ORAL at 09:26

## 2018-02-05 RX ADMIN — AMLODIPINE BESYLATE 10 MG: 10 TABLET ORAL at 09:25

## 2018-02-05 RX ADMIN — POTASSIUM CHLORIDE 10 MEQ: 750 CAPSULE, EXTENDED RELEASE ORAL at 09:26

## 2018-02-05 RX ADMIN — FUROSEMIDE 20 MG: 20 TABLET ORAL at 09:26

## 2018-02-05 RX ADMIN — ENOXAPARIN SODIUM 80 MG: 80 INJECTION SUBCUTANEOUS at 09:26

## 2018-02-05 NOTE — PROGRESS NOTES
"GI Daily Progress Note  Subjective:    Mr. Douglas is in good spirits and states he feels well today.  No abdominal pain.  Had a bowel movement this morning without any evidence of bleeding.      Objective:    /97 (BP Location: Left arm, Patient Position: Sitting)  Pulse 104  Temp 98.2 °F (36.8 °C) (Oral)   Resp 16  Ht 175.3 cm (69\")  Wt 77.9 kg (171 lb 12.8 oz)  SpO2 95%  BMI 25.37 kg/m2    Physical Exam   Constitutional: He is oriented to person, place, and time. He appears well-developed. No distress.   Cardiovascular: Normal rate and regular rhythm.    Pulmonary/Chest: Effort normal. No respiratory distress.   Abdominal: Soft. Bowel sounds are normal. He exhibits no distension. There is no tenderness.   Neurological: He is alert and oriented to person, place, and time.   Skin: He is not diaphoretic.   Psychiatric: He has a normal mood and affect. His behavior is normal.   Vitals reviewed.      Lab  Lab Results   Component Value Date    WBC 9.46 02/04/2018    HGB 13.4 02/04/2018    HGB 13.9 02/02/2018    HGB 14.2 02/01/2018    MCV 88.2 02/04/2018     02/04/2018    INR 1.20 02/05/2018    INR 1.20 02/04/2018    INR 1.50 02/03/2018    INR 4.28 02/02/2018    INR 4.86 02/01/2018       Lab Results   Component Value Date    GLUCOSE 90 02/05/2018    BUN 15 02/05/2018    CREATININE 1.00 02/05/2018    EGFRIFNONA 71 02/05/2018    BCR 15.0 02/05/2018    CO2 27.0 02/05/2018    CALCIUM 8.8 02/05/2018    ALBUMIN 3.50 02/05/2018    AST 35 (H) 02/05/2018    ALT 82 (H) 02/05/2018         Assessment:    1. Choledocholithiasis with obstruction, s/p ERCP with sphincterotomy and extraction   2. Elevated LFTs, secondary to above, improving  3. Epigastric pain, resolved   4. Atrial fibrillation on chronic Coumadin therapy    Plan:    >>> Complete 5 days of antibiotics  >>> No signs of bleeding at this time.  Counseled patient to monitor stools for signs of bleeding.    >>> Repeat liver panel in 2 weeks with primary " physician     Stable for discharge home from GI standpoint.    Will sign off.  Please call for questions or concerns.      ELIZABETH Mayers  02/05/18  10:50 AM

## 2018-02-05 NOTE — PROGRESS NOTES
"Pharmacy Consult  -  Warfarin    Tanner Douglas Jr. is a 89 yo man admitted 2/1/18 with choledocholithiasis.  He was evaluated by his primary physician with a chief complaint of epigastric and right upper quadrant pain for five days.  He was started on pantoprazole with some relief.  He had outpatient labs reveal elevated LFTs and he was counseled to present to the ED.  Labs revealed ALT//154; Bilirubin 5.6 and Alk phos 650.  MRCP showed choledocholithiasis with 10 mm diameter lCBD stone and dilated common bile duct (14 mm).  He had an ERCP on 2/3/18.  Other problems include Afib and chronically anticoagulated with warfarin, HTN/HLD, and CAD  .   Height - 175.3 cm (69\")  Weight - 77.9 kg (171 lb 12.8 oz)    Consulting Provider: - MD Tino  Indication: - AFib  Goal INR: - 2-3  Home Regimen:                        - Warfarin 2.5mg daily      Bridge Therapy: Enoxaparin 1mg/kg q12h      Drug-Drug Interactions with current regimen:              Aspirin - increased bleeding risk              Levofloxacin - increased INR (to stop 2/8/18)              Pantoprazole - may increase INR     Warfarin Dosing During Admission:     Date  2/1 2/2 2/3 2/4  2/5           INR  5.25 4.28 1.5 1.2  1.2           Dose  0 0  Vitamin K 5mg 0 2.5mg                 Education Provided:  Completed education 2/4/18    Labs:      Results from last 7 days     Lab Units 02/05/18  0615 02/04/18  0651 02/03/18  0736 02/02/18  0627 02/01/18  2221 02/01/18  1711   INR  1.20 1.20 1.50 4.28 4.86 5.25   HEMOGLOBIN g/dL --  13.4 --  13.9 --  14.2   HEMATOCRIT % --  41.0 --  42.3 --  43.2   PLATELETS 10*3/mm3 --  283 --  324 --  331       Results from last 7 days     Lab Units 02/05/18  0615 02/04/18  0651 02/03/18  0736   SODIUM mmol/L 139 138 138   POTASSIUM mmol/L 3.0* 3.1* 3.4*   CHLORIDE mmol/L 103 103 102   CO2 mmol/L 27.0 27.0 28.0   BUN mg/dL 15 16 21   CREATININE mg/dL 1.00 0.90 1.00   CALCIUM mg/dL 8.8 8.2* 8.0*   BILIRUBIN mg/dL " 2.1* 2.3* 3.3*   ALK PHOS U/L 426* 450* 554*   ALT (SGPT) U/L 82* 92* 125*   AST (SGOT) U/L 35* 29 36*   GLUCOSE mg/dL 90 80 82   Current dietary intake: Regular diet %    Assessment/Plan:     Will give a once dose of warfarin 5 mg in an effort to override the vitamin K administered 2/2/18 as there has been no movement in the INR.  Will then resume the home dose of 2.5 mg po daily.  Pharmacy Service will continue to follow the patient's clinical progress and monitor for evidence of warfarin-induced adverse effects.    Cata Fish, PharmD, BCPS

## 2018-02-05 NOTE — DISCHARGE SUMMARY
"    [facility] Medicine Services  DISCHARGE SUMMARY    Patient Name: Tanner Douglas Jr.  : 1929  MRN: [id number]    Date of Admission: 2018  Date of Discharge:  2018  Primary Care Physician: Kelechi Calvert MD    Consults     Date and Time Order Name Status Description    2018 2117 Inpatient Consult to Gastroenterology Completed         Hospital Course     Presenting Problem:   Abdominal pain, unspecified abdominal location [R10.9]    Active Hospital Problems (** Indicates Principal Problem)    Diagnosis Date Noted   • **Calculus of bile duct without cholecystitis with obstruction [K80.51] 2018   • Elevated LFTs [R79.89] 2018   • Hyperbilirubinemia [E80.6] 2018   • Leukocytosis [D72.829] 2018   • Supratherapeutic INR [R79.1] 2018   • Pleural effusion, bilateral [J90] 2018   • A-fib [I48.91] 2018   • CAD (coronary artery disease) [I25.10] 2018   • Essential hypertension [I10] 2018   • Hyperlipidemia [E78.5] 2018   • Abdominal pain [R10.9] 2018      Resolved Hospital Problems    Diagnosis Date Noted Date Resolved   No resolved problems to display.          Hospital Course:  Tanner Douglas Jr. is a 88 y.o. male, relatively poor historian, has history of chronic Afib and chronically anticoagulated with Coumadin, HTN/HLD, and CAD, who presented with \"abnormal lab\"-elevated LFT, but pt is not reporting any pain/symptoms at the time of my evaluation. He has a history of CCY. MRCP revealed dilated CBD with a 10 mm stone. Pt had leukocytosis on presentation, but was afebrile/nontoxic and no RUQ pain. No antibiotics given on at admission, but pt was seen by Dr Justin from GI, who started pre-op Levaquin prior to ERCP and recommended continuing it for a 5 day course. No post op complications noted, H/H stable. LFT trending down, but not significant, so will need to cont to monitor. GI recommends repeating LFT " again in about 1 week at PCP's office. Since LFT was still elevated, Statin was held at discharge, which will need to be resumed once LFT's returned to baseline. Of note, INR was slightly supra-therapeutic on admission. Pt received Vit K 5mg for reversal to do ERCP. Pt was monitored 24hr post-op to ensure no post op bleeding, at which time Coumadin was resumed with Lovenox bridge. At time of discharge, INR is still sub-therapeutic and patient is to cont with Coumadin therapy and instructed to get INR check in 1 day. His PCP is to provide further dosing adjustment and monitoring of INR, especially while on abx.     Procedure(s):  ENDOSCOPIC RETROGRADE CHOLANGIOPANCREATOGRAPHY   02/03 1217 ERCP   Day of Discharge     HPI:   Doing well, no acute events O/N. Denies any pain or discomfort. No family present, attempted to call daughter 2x.    Review of Systems  Otherwise ROS is negative except as mentioned in the HPI.    Vital Signs:   Temp:  [97.5 °F (36.4 °C)-98.9 °F (37.2 °C)] 98.2 °F (36.8 °C)  Heart Rate:  [] 104  Resp:  [16-20] 16  BP: (138-155)/(79-97) 155/97     Physical Exam:  General Assessment: No acute cardiopulmonary distress. Well developed and well nourished.      HEENT: NCAT, PERRL, MM moist      Neck: Supple      CVS: Irreg rhythm, reg rate, S1S2 normal      Resp: CTAB, no adventitious sound      Abd: Soft, NT, ND, normal BS, no guarding or peritoneal signs      Ext: No edema, both calves are symmetric and NTTP      Neuro: No facial asymmetry, speech clear      Skin: W/D/I. + ecchymosis on LUE.      Psych: Affect is appropriate    Pertinent  and/or Most Recent Results       Results from last 7 days  Lab Units 02/05/18  0615 02/04/18  0651 02/03/18  0736 02/02/18  0627 02/01/18  1711   WBC 10*3/mm3  --  9.46  --  13.00* 16.54*   HEMOGLOBIN g/dL  --  13.4  --  13.9 14.2   HEMATOCRIT %  --  41.0  --  42.3 43.2   PLATELETS 10*3/mm3  --  283  --  324 331   SODIUM mmol/L 139 138 138 138 136   POTASSIUM  mmol/L 3.0* 3.1* 3.4* 3.4* 4.6   CHLORIDE mmol/L 103 103 102 101 103   CO2 mmol/L 27.0 27.0 28.0 28.0 25.0   BUN mg/dL 15 16 21 23 22   CREATININE mg/dL 1.00 0.90 1.00 1.10 1.10   GLUCOSE mg/dL 90 80 82 75 106*   CALCIUM mg/dL 8.8 8.2* 8.0* 8.4* 8.9       Results from last 7 days  Lab Units 02/05/18  0615 02/04/18  0651 02/03/18  0736 02/02/18  0627 02/01/18  2221 02/01/18  1711   BILIRUBIN mg/dL 2.1* 2.3* 3.3* 3.8*  --  5.6*   ALK PHOS U/L 426* 450* 554* 584*  --  650*   ALT (SGPT) U/L 82* 92* 125* 178*  --  225*   AST (SGOT) U/L 35* 29 36* 79*  --  154*   PROTIME Seconds 13.2* 13.1* 16.6* 48.8* 55.6* 60.2*   INR  1.20 1.20 1.50 4.28 4.86 5.25           Invalid input(s): TG, LDLREALC    Results from last 7 days  Lab Units 02/03/18  0736   TSH mIU/mL 6.108*     Brief Urine Lab Results  (Last result in the past 365 days)      Color   Clarity   Blood   Leuk Est   Nitrite   Protein   CREAT   Urine HCG        02/01/18 1726 Orange(A) Cloudy(A) Negative Small (1+)(A) Negative 100 mg/dL (2+)(A)               Microbiology Results Abnormal     Procedure Component Value - Date/Time    Urine Culture - Urine, Urine, Clean Catch [[id number]]  (Normal) Collected:  02/01/18 1726    Lab Status:  Final result Specimen:  Urine from Urine, Clean Catch Updated:  02/03/18 0804     Urine Culture No growth at 2 days          Imaging Results (all)     Procedure Component Value Units Date/Time    XR Chest 1 View [[id number]] Collected:  02/01/18 1718     Updated:  02/01/18 1743    Narrative:          EXAMINATION: XR CHEST 1 VW - 02/01/2018     INDICATION: Abdominal pain.     COMPARISON: None.     FINDINGS: Cardiac size enlarged with increased central pulmonary  vascularity. Ill-defined bibasilar pulmonary opacifications greatest on  the right. Minimal blunting of the lateral costophrenic sulci concerning  for trace pleural effusions. No pneumothorax.           Impression:       Cardiomegaly with increased central pulmonary vascularity  and  ill-defined bibasilar pulmonary opacifications greatest on the  right. Findings are most consistent with probable trace bilateral  pleural effusions.     DICTATED:     02/01/2018  EDITED    :     02/01/2018      This report was finalized on 2/1/2018 5:41 PM by Dr. Alden Kent.       CT Abdomen Pelvis With Contrast [[id number]] Collected:  02/01/18 1656     Updated:  02/01/18 1910    Narrative:       EXAM:    CT Abdomen and Pelvis With Intravenous Contrast    CLINICAL HISTORY:    88 years old, male; Pain; Abdominal pain; Additional info: Abdominal pain,   unspecified    TECHNIQUE:    Axial computed tomography images of the abdomen and pelvis with intravenous   contrast.  All CT scans at this facility use one or more dose reduction   techniques, viz.: automated exposure control; ma/kV adjustment per patient size   (including targeted exams where dose is matched to indication; i.e. head); or   iterative reconstruction technique.    Coronal reformatted images were created and reviewed.    CONTRAST:    95 mL of isovue 300 administered intravenously.    COMPARISON:    No relevant prior studies available.    FINDINGS:    Lower thorax:  Bibasilar nonspecific lung base density is present, consistent   with dependent atelectasis.  There is pulmonary granulomatous scarring.  The   heart demonstrates diffuse enlargement.  A small hiatal hernia is present.     ABDOMEN:    Liver:  Unremarkable.    Gallbladder and bile ducts:  Asymptomatic bile duct dilatation is considered   normal after cholecystectomy. However there is question of a slightly   hyperdense filling defect in distal common bile duct which could be partially   calcified stone or soft tissue mass. Clinical correlation is recommended as to   whether the patient is symptomatic. MRCP would be helpful for further   evaluation.  There has been a cholecystectomy.  There is intrahepatic and   extrahepatic ductal dilatation. The distal common bile duct measures    approximately 1.3 x 1.2 CM.    Pancreas:  Unremarkable.  No mass.  No ductal dilation.    Spleen:  Unremarkable.  No splenomegaly.    Adrenals:  Unremarkable.  No mass.    Kidneys and ureters:  There are multiple simple left renal cysts.  There is   an extrarenal pelvis on the right.    Stomach and bowel:  Unremarkable.    Appendix:  A normal appendix is identified.     PELVIS:    Bladder:  The bladder is distended.    Reproductive:  Unremarkable as visualized.     ABDOMEN and PELVIS:    Intraperitoneal space:  Unremarkable.  No free air.  No significant fluid   collection.    Bones/joints:  There are degenerative changes of the spine.  No acute   fracture.  No dislocation.    Soft tissues:  Unremarkable.    Vasculature:  There is aortic ectasia.  There is extensive aortoiliac   atherosclerosis.  No abdominal aortic aneurysm.    Lymph nodes:  Unremarkable.  No enlarged lymph nodes.      Impression:         Biliary ductal dilatation.Asymptomatic bile duct dilatation is considered   normal after cholecystectomy. However there is question of a slightly   hyperdense filling defect in distal common bile duct which could be partially   calcified stone or soft tissue mass. Clinical correlation is recommended as to   whether the patient is symptomatic. MRCP would be helpful for further   evaluation.    THIS DOCUMENT HAS BEEN ELECTRONICALLY SIGNED BY LAKESHA VICENTE MD    MRI abdomen wo contrast mrcp [[id number]] Collected:  02/01/18 1928     Updated:  02/02/18 0207    Narrative:       EXAM:  MR Abdomen Without Intravenous Contrast, MRCP Protocol    EXAM DATE/TIME:  2/1/2018 7:28 PM    CLINICAL HISTORY:  88 years old, male; Elevated white blood cell count,   unspecified; Other disorders of bilirubin metabolism; Unspecified abdominal   pain; Abnormal coagulation profile; Other specified abnormal findings of blood   chemistry; Signs and symptoms; Patient HX: Abdominal pain; Possible biliary   obstruction elevated lft's and  elevated bilirubin    TECHNIQUE:  Multiplanar magnetic resonance images of the abdomen without   intravenous contrast using MRCP protocol.    COMPARISON:  No relevant prior studies available.    FINDINGS:    Lower thorax:  Minimal posterior pleural effusions.    Bile ducts:  Choledocholithiasis with 10 mm diameter low signal downstream   common bile duct stone.  Dilated common bile duct and biliary tree consistent   with biliary obstruction.  Upstream common bile duct measures 14 mm in diameter.    Gallbladder:  Previous cholecystectomy.    Liver:  Unremarkable.    Pancreas:  Unremarkable.  No ductal dilation.    Spleen:  Spleen upper limits of normal in size.    Adrenals:  Unremarkable.  No mass.    Kidneys and ureters:  Small bilateral renal cysts left greater than right.    No hydronephrosis.    Stomach and bowel:  Unremarkable.  No obstruction.    Other findings:  Distended urinary bladder.      Impression:       1.  Choledocholithiasis with 10 mm diameter low signal downstream common bile   duct stone.  2.  Dilated common bile duct and biliary tree consistent with biliary   obstruction.  3.  Previous cholecystectomy.    THIS DOCUMENT HAS BEEN ELECTRONICALLY SIGNED BY ARMANDO BYRD JR. MD    FL ERCP pancreatic and biliary ducts [[id number]] Collected:  02/04/18 1123     Updated:  02/05/18 0755    Narrative:       HISTORY: Common duct stone.     EXAMINATION: FLUOROSCOPY FOR ERCP - 02/03/2018     FINDINGS: Dictation is to record 12 minutes and 22 seconds fluoroscopy  time. A total of 6-inch procedural images obtained show endoscope in  side cannula placement, with contrast injection of the dilated common  duct and subsequent balloon sweep. There is good emptying of contrast  from the duct on the final image. Please see the procedure report for  full details.       Impression:       Fluoroscopy provided for ERCP.     DICTATED:     02/03/2018  EDITED    :     02/04/2018      This report was finalized on 2/5/2018 7:53  AM by DR. Alex Ballard MD.                              Discharge Details      Tanner Douglas Jr.   Home Medication Instructions CASTRO:[id number]    Printed on:02/05/18 1343   Medication Information                      allopurinol (ZYLOPRIM) 100 MG tablet  Take 100 mg by mouth Daily.             amLODIPine (NORVASC) 10 MG tablet  Take 10 mg by mouth Daily.             aspirin 81 MG EC tablet  Take 81 mg by mouth Daily.             furosemide (LASIX) 20 MG tablet  Take 20 mg by mouth Daily.             levoFLOXacin (LEVAQUIN) 500 MG tablet  Take 1 tablet by mouth Daily for 3 days.             levothyroxine (SYNTHROID, LEVOTHROID) 25 MCG tablet  Take 25 mcg by mouth Daily.             metoprolol tartrate (LOPRESSOR) 50 MG tablet  Take 50 mg by mouth Daily.             Multiple Vitamins-Minerals (MULTIVITAMIN PO)  Take 1 tablet by mouth Daily.             pantoprazole (PROTONIX) 40 MG EC tablet  Take 40 mg by mouth Daily.             potassium chloride (K-DUR,KLOR-CON) 20 MEQ CR tablet  Take 20 mEq by mouth 2 (Two) Times a Day.             promethazine (PHENERGAN) 25 MG tablet  Take 25 mg by mouth Every 6 (Six) Hours As Needed for Nausea or Vomiting.             terazosin (HYTRIN) 5 MG capsule  Take 5 mg by mouth Every Night.             warfarin (COUMADIN) 2.5 MG tablet  Take 2.5 mg by mouth Daily.                   Discharge Disposition:  Home or Self Care    Discharge Diet:      Discharge Activity:       Special Instructions:  Future Appointments  Date Time Provider Department Center   2/6/2018 1:00 PM Wilfrid Justin MD [facility] None       Additional Instructions for the Follow-ups that You Need to Schedule     Discharge Follow-up with PCP    As directed    Follow Up Details:  Please call for appnt to be seen in 1 wk with repeat LFT           Discharge Follow-up with Specialty: GI    As directed    Specialty:  GI    Follow Up Details:  Please call for appnt                     Time Spent on Discharge: 50  minutes    Ren Jiménez MD  02/05/18  1:45 PM

## 2018-02-05 NOTE — PLAN OF CARE
Problem: Patient Care Overview (Adult)  Goal: Plan of Care Review  Outcome: Ongoing (interventions implemented as appropriate)      Problem: Cholecystitis/Cholecystectomy (Adult)  Goal: Signs and Symptoms of Listed Potential Problems Will be Absent or Manageable (Cholecystitis/Cholecystectomy)  Outcome: Outcome(s) achieved Date Met: 02/05/18      Problem: Fall Risk (Adult)  Goal: Identify Related Risk Factors and Signs and Symptoms  Outcome: Outcome(s) achieved Date Met: 02/05/18

## 2018-02-05 NOTE — PLAN OF CARE
Problem: Fall Risk (Adult)  Goal: Identify Related Risk Factors and Signs and Symptoms  Outcome: Ongoing (interventions implemented as appropriate)    Goal: Absence of Falls  Outcome: Ongoing (interventions implemented as appropriate)   02/05/18 0401   Fall Risk (Adult)   Absence of Falls making progress toward outcome

## 2022-03-22 NOTE — TELEPHONE ENCOUNTER
DAUGHTER CAME IN OFFICE TO REQUEST REFILLS FOR PT, PLEASE SEND TO [facility]:  LEVOTHYROXINE 25MCG

## 2022-03-23 RX ORDER — LEVOTHYROXINE SODIUM 0.03 MG/1
25 TABLET ORAL DAILY
Qty: 90 TABLET | Refills: 1 | Status: SHIPPED | OUTPATIENT
Start: 2022-03-23 | End: 2022-04-09 | Stop reason: SDUPTHER

## 2022-03-29 RX ORDER — AMLODIPINE BESYLATE 10 MG/1
TABLET ORAL
Qty: 90 TABLET | Refills: 0 | Status: SHIPPED | OUTPATIENT
Start: 2022-03-29 | End: 2022-06-07 | Stop reason: SDUPTHER

## 2022-03-30 RX ORDER — AMLODIPINE BESYLATE 10 MG/1
TABLET ORAL
Qty: 90 TABLET | Refills: 0 | OUTPATIENT
Start: 2022-03-30

## 2022-04-06 ENCOUNTER — OFFICE VISIT (OUTPATIENT)
Dept: FAMILY MEDICINE CLINIC | Facility: CLINIC | Age: 87
End: 2022-04-06

## 2022-04-06 VITALS
DIASTOLIC BLOOD PRESSURE: 80 MMHG | BODY MASS INDEX: 27.85 KG/M2 | HEIGHT: 69 IN | SYSTOLIC BLOOD PRESSURE: 136 MMHG | WEIGHT: 188 LBS | OXYGEN SATURATION: 98 % | HEART RATE: 76 BPM

## 2022-04-06 DIAGNOSIS — E03.9 ACQUIRED HYPOTHYROIDISM: ICD-10-CM

## 2022-04-06 DIAGNOSIS — I48.91 ATRIAL FIBRILLATION, UNSPECIFIED TYPE: ICD-10-CM

## 2022-04-06 DIAGNOSIS — R60.9 EDEMA, UNSPECIFIED TYPE: Primary | ICD-10-CM

## 2022-04-06 DIAGNOSIS — I10 ESSENTIAL HYPERTENSION: ICD-10-CM

## 2022-04-06 DIAGNOSIS — E78.5 HYPERLIPIDEMIA, UNSPECIFIED HYPERLIPIDEMIA TYPE: ICD-10-CM

## 2022-04-06 LAB — INR PPP: 2.5 (ref 0.9–1.1)

## 2022-04-06 PROCEDURE — 85610 PROTHROMBIN TIME: CPT | Performed by: FAMILY MEDICINE

## 2022-04-06 PROCEDURE — 36416 COLLJ CAPILLARY BLOOD SPEC: CPT | Performed by: FAMILY MEDICINE

## 2022-04-06 PROCEDURE — 99214 OFFICE O/P EST MOD 30 MIN: CPT | Performed by: FAMILY MEDICINE

## 2022-04-06 RX ORDER — TERAZOSIN 5 MG/1
1 CAPSULE ORAL
COMMUNITY
Start: 2022-01-13 | End: 2022-04-06 | Stop reason: SDUPTHER

## 2022-04-06 RX ORDER — POTASSIUM CHLORIDE 20 MEQ/1
20 TABLET, EXTENDED RELEASE ORAL 2 TIMES DAILY
Qty: 180 TABLET | Refills: 1 | Status: SHIPPED | OUTPATIENT
Start: 2022-04-06 | End: 2022-09-30 | Stop reason: SDUPTHER

## 2022-04-06 RX ORDER — ALLOPURINOL 100 MG/1
1 TABLET ORAL DAILY
COMMUNITY
Start: 2021-11-08 | End: 2022-05-06

## 2022-04-06 RX ORDER — FUROSEMIDE 20 MG/1
40 TABLET ORAL DAILY
Qty: 180 TABLET | Refills: 1 | Status: SHIPPED | OUTPATIENT
Start: 2022-04-06 | End: 2022-09-30 | Stop reason: SDUPTHER

## 2022-04-06 RX ORDER — AMLODIPINE BESYLATE 10 MG/1
1 TABLET ORAL DAILY
COMMUNITY
Start: 2021-12-30 | End: 2022-04-06 | Stop reason: SDUPTHER

## 2022-04-06 RX ORDER — TERAZOSIN 5 MG/1
5 CAPSULE ORAL
Qty: 90 CAPSULE | Refills: 1 | Status: SHIPPED | OUTPATIENT
Start: 2022-04-06 | End: 2022-09-30 | Stop reason: SDUPTHER

## 2022-04-06 RX ORDER — LEVOTHYROXINE SODIUM 0.03 MG/1
1 TABLET ORAL DAILY
COMMUNITY
Start: 2022-03-20 | End: 2022-04-09 | Stop reason: SDUPTHER

## 2022-04-06 NOTE — PROGRESS NOTES
Follow Up Office Visit      Date of Visit:  2022   Patient Name: Tanner Douglas Jr.  : 1929   MRN: [id number]     Chief Complaint:    Chief Complaint   Patient presents with   • Wheezing   • Toenail Trim              History of Present Illness: Tanner Douglas Jr. is a 89 y.o. male who is here today for follow up.  Dago comes in today for us to look at his legs.  Having much more edema.  He has this chronically but it is somewhat worse now.  Weight is up some to.  No major shortness of breath but daughter thought she had heard some wheezing.  He also has some renal insufficiency.  Currently on a diuretic but we have to adjust based on renal function and swelling.  Tight rope.  We also did some toenail trimming today.  We did also do some refills on medication that was needed.  HPI      Subjective      Review of Systems:   Review of Systems    Past Medical History:   Past Medical History:   Diagnosis Date   • A-fib (HCC)     PAROXYSMAL   • Basal cell carcinoma    • Benign hypertension    • CAD (coronary artery disease)    • Chronic anticoagulation    • Chronic gout without tophus    • Colon perforation (HCC)    • Coronary artery disease     STRESS TEST PROBABLY NUCLEAR   • Edema     BILATERAL LOWER LIMB   • Gout    • Hard of hearing     BILATERAL   • High risk medication use    • History of use of hearing aid in both ears    • Hyperlipidemia    • Hypertension    • Hypothyroid    • Thyroid disease        Past Surgical History:   Past Surgical History:   Procedure Laterality Date   • ABDOMINAL SURGERY     • ANGIOPLASTY      CORONARY ARTERY DISEASE  AND    • BASAL CELL CARCINOMA EXCISION     • CHOLECYSTECTOMY     • COLON RESECTION SMALL BOWEL     • ERCP N/A 2018    Procedure: ENDOSCOPIC RETROGRADE CHOLANGIOPANCREATOGRAPHY;  Surgeon: Wilfrid Justin MD;  Location: [facility] ENDOSCOPY;  Service:    • HERNIA REPAIR     • KNEE SURGERY     • PROSTATE SURGERY         Family History:  "No family history on file.    Social History:   Social History     Socioeconomic History   • Marital status:    Tobacco Use   • Smoking status: Never Smoker   • Smokeless tobacco: Never Used   Substance and Sexual Activity   • Alcohol use: No   • Drug use: No       Medications:     Current Outpatient Medications:   •  allopurinol (ZYLOPRIM) 100 MG tablet, Take 1 tablet by mouth Daily., Disp: , Rfl:   •  furosemide (LASIX) 20 MG tablet, Take 2 tablets by mouth Daily., Disp: 180 tablet, Rfl: 1  •  levothyroxine (SYNTHROID, LEVOTHROID) 25 MCG tablet, Take 1 tablet by mouth Daily., Disp: , Rfl:   •  potassium chloride (K-DUR,KLOR-CON) 20 MEQ CR tablet, Take 1 tablet by mouth 2 (Two) Times a Day., Disp: 180 tablet, Rfl: 1  •  terazosin (HYTRIN) 5 MG capsule, Take 1 capsule by mouth every night at bedtime., Disp: 90 capsule, Rfl: 1  •  amLODIPine (NORVASC) 10 MG tablet, TAKE ONE TABLET BY MOUTH DAILY, Disp: 90 tablet, Rfl: 0  •  aspirin 81 MG EC tablet, Take 81 mg by mouth Daily., Disp: , Rfl:   •  levothyroxine (SYNTHROID, LEVOTHROID) 25 MCG tablet, Take 1 tablet by mouth Daily., Disp: 90 tablet, Rfl: 1  •  metoprolol tartrate (LOPRESSOR) 50 MG tablet, Take 50 mg by mouth Daily., Disp: , Rfl:   •  Multiple Vitamins-Minerals (MULTIVITAMIN PO), Take 1 tablet by mouth Daily., Disp: , Rfl:   •  pantoprazole (PROTONIX) 40 MG EC tablet, Take 40 mg by mouth Daily., Disp: , Rfl:   •  promethazine (PHENERGAN) 25 MG tablet, Take 25 mg by mouth Every 6 (Six) Hours As Needed for Nausea or Vomiting., Disp: , Rfl:   •  warfarin (COUMADIN) 2.5 MG tablet, Take 2.5 mg by mouth Daily., Disp: , Rfl:     Allergies:   Allergies   Allergen Reactions   • Morphine And Related Delirium   • Penicillins Swelling       Objective     Physical Exam:  Vital Signs:   Vitals:    04/06/22 1401   BP: 136/80   Pulse: 76   SpO2: 98%   Weight: 85.3 kg (188 lb)   Height: 175.3 cm (69\")     Body mass index is 27.76 kg/m².     Physical " Exam    Procedures    BMI is above normal parameters. Recommendations: Weight because of edema        Assessment / Plan      Assessment/Plan:   Diagnoses and all orders for this visit:    1. Edema, unspecified type (Primary)    2. Atrial fibrillation, unspecified type (HCC)  -     CBC Auto Differential; Future  -     Comprehensive Metabolic Panel; Future  -     TSH; Future  -     Lipid Panel; Future  -     CBC Auto Differential  -     Comprehensive Metabolic Panel  -     TSH  -     Lipid Panel    3. Essential hypertension  -     CBC Auto Differential; Future  -     Comprehensive Metabolic Panel; Future  -     TSH; Future  -     Lipid Panel; Future  -     Basic metabolic panel; Future  -     CBC Auto Differential  -     Comprehensive Metabolic Panel  -     TSH  -     Lipid Panel    4. Hyperlipidemia, unspecified hyperlipidemia type  -     CBC Auto Differential; Future  -     Comprehensive Metabolic Panel; Future  -     TSH; Future  -     Lipid Panel; Future  -     Basic metabolic panel; Future  -     CBC Auto Differential  -     Comprehensive Metabolic Panel  -     TSH  -     Lipid Panel    5. Acquired hypothyroidism  -     CBC Auto Differential; Future  -     Comprehensive Metabolic Panel; Future  -     TSH; Future  -     Lipid Panel; Future  -     CBC Auto Differential  -     Comprehensive Metabolic Panel  -     TSH  -     Lipid Panel    Other orders  -     potassium chloride (K-DUR,KLOR-CON) 20 MEQ CR tablet; Take 1 tablet by mouth 2 (Two) Times a Day.  Dispense: 180 tablet; Refill: 1  -     terazosin (HYTRIN) 5 MG capsule; Take 1 capsule by mouth every night at bedtime.  Dispense: 90 capsule; Refill: 1  -     furosemide (LASIX) 20 MG tablet; Take 2 tablets by mouth Daily.  Dispense: 180 tablet; Refill: 1         Gave instructions on how to adjust his Lasix.  Try a different type of support stocking.  He will be back in 1 month for an INR check.  Check BMP at that time.  And then see us for a wellness exam in 2  months.    Follow Up:   Return in about 2 months (around 6/6/2022) for Annual Wellness-Provider.    Kelechi Calvert  [facility]

## 2022-04-07 ENCOUNTER — TELEPHONE (OUTPATIENT)
Dept: FAMILY MEDICINE CLINIC | Facility: CLINIC | Age: 87
End: 2022-04-07

## 2022-04-07 LAB
ALBUMIN SERPL-MCNC: 4.4 G/DL (ref 3.5–4.6)
ALBUMIN/GLOB SERPL: 2 {RATIO} (ref 1.2–2.2)
ALP SERPL-CCNC: 101 IU/L (ref 44–121)
ALT SERPL-CCNC: 14 IU/L (ref 0–44)
AST SERPL-CCNC: 24 IU/L (ref 0–40)
BASOPHILS # BLD AUTO: 0.1 X10E3/UL (ref 0–0.2)
BASOPHILS NFR BLD AUTO: 1 %
BILIRUB SERPL-MCNC: 1.7 MG/DL (ref 0–1.2)
BUN SERPL-MCNC: 27 MG/DL (ref 10–36)
BUN/CREAT SERPL: 17 (ref 10–24)
CALCIUM SERPL-MCNC: 9.7 MG/DL (ref 8.6–10.2)
CHLORIDE SERPL-SCNC: 103 MMOL/L (ref 96–106)
CHOLEST SERPL-MCNC: 144 MG/DL (ref 100–199)
CO2 SERPL-SCNC: 18 MMOL/L (ref 20–29)
CREAT SERPL-MCNC: 1.6 MG/DL (ref 0.76–1.27)
EGFRCR SERPLBLD CKD-EPI 2021: 40 ML/MIN/1.73
EOSINOPHIL # BLD AUTO: 0.2 X10E3/UL (ref 0–0.4)
EOSINOPHIL NFR BLD AUTO: 2 %
ERYTHROCYTE [DISTWIDTH] IN BLOOD BY AUTOMATED COUNT: 14.4 % (ref 11.6–15.4)
GLOBULIN SER CALC-MCNC: 2.2 G/DL (ref 1.5–4.5)
GLUCOSE SERPL-MCNC: 102 MG/DL (ref 65–99)
HCT VFR BLD AUTO: 48.9 % (ref 37.5–51)
HDLC SERPL-MCNC: 52 MG/DL
HGB BLD-MCNC: 16.1 G/DL (ref 13–17.7)
IMM GRANULOCYTES # BLD AUTO: 0 X10E3/UL (ref 0–0.1)
IMM GRANULOCYTES NFR BLD AUTO: 0 %
LDLC SERPL CALC-MCNC: 78 MG/DL (ref 0–99)
LYMPHOCYTES # BLD AUTO: 0.7 X10E3/UL (ref 0.7–3.1)
LYMPHOCYTES NFR BLD AUTO: 6 %
MCH RBC QN AUTO: 27.4 PG (ref 26.6–33)
MCHC RBC AUTO-ENTMCNC: 32.9 G/DL (ref 31.5–35.7)
MCV RBC AUTO: 83 FL (ref 79–97)
MONOCYTES # BLD AUTO: 0.7 X10E3/UL (ref 0.1–0.9)
MONOCYTES NFR BLD AUTO: 6 %
NEUTROPHILS # BLD AUTO: 9.8 X10E3/UL (ref 1.4–7)
NEUTROPHILS NFR BLD AUTO: 85 %
PLATELET # BLD AUTO: 449 X10E3/UL (ref 150–450)
POTASSIUM SERPL-SCNC: 4.7 MMOL/L (ref 3.5–5.2)
PROT SERPL-MCNC: 6.6 G/DL (ref 6–8.5)
RBC # BLD AUTO: 5.87 X10E6/UL (ref 4.14–5.8)
SODIUM SERPL-SCNC: 145 MMOL/L (ref 134–144)
TRIGL SERPL-MCNC: 69 MG/DL (ref 0–149)
TSH SERPL DL<=0.005 MIU/L-ACNC: 7.29 UIU/ML (ref 0.45–4.5)
VLDLC SERPL CALC-MCNC: 14 MG/DL (ref 5–40)
WBC # BLD AUTO: 11.6 X10E3/UL (ref 3.4–10.8)

## 2022-04-09 RX ORDER — LEVOTHYROXINE SODIUM 0.05 MG/1
50 TABLET ORAL DAILY
Qty: 90 TABLET | Refills: 1 | Status: SHIPPED | OUTPATIENT
Start: 2022-04-09 | End: 2022-09-30 | Stop reason: SDUPTHER

## 2022-05-06 RX ORDER — ALLOPURINOL 100 MG/1
TABLET ORAL
Qty: 90 TABLET | Refills: 0 | Status: SHIPPED | OUTPATIENT
Start: 2022-05-06 | End: 2022-08-04

## 2022-05-11 ENCOUNTER — TELEPHONE (OUTPATIENT)
Dept: FAMILY MEDICINE CLINIC | Facility: CLINIC | Age: 87
End: 2022-05-11

## 2022-05-11 NOTE — TELEPHONE ENCOUNTER
Patient's daughter called back to schedule blood work and INR. She would like to know if someone could come to the car the day of his appointment 5/13/22 to do it. Patient has a walker. She would like a call back [phone].

## 2022-05-13 ENCOUNTER — LAB (OUTPATIENT)
Dept: FAMILY MEDICINE CLINIC | Facility: CLINIC | Age: 87
End: 2022-05-13

## 2022-05-13 DIAGNOSIS — I48.91 ATRIAL FIBRILLATION, UNSPECIFIED TYPE: Primary | ICD-10-CM

## 2022-05-13 DIAGNOSIS — I10 ESSENTIAL HYPERTENSION: ICD-10-CM

## 2022-05-13 DIAGNOSIS — E78.5 HYPERLIPIDEMIA, UNSPECIFIED HYPERLIPIDEMIA TYPE: ICD-10-CM

## 2022-05-13 LAB — INR PPP: 2.6 (ref 0.9–1.1)

## 2022-05-13 PROCEDURE — 36415 COLL VENOUS BLD VENIPUNCTURE: CPT | Performed by: FAMILY MEDICINE

## 2022-05-13 PROCEDURE — 85610 PROTHROMBIN TIME: CPT | Performed by: FAMILY MEDICINE

## 2022-05-13 PROCEDURE — 36416 COLLJ CAPILLARY BLOOD SPEC: CPT | Performed by: FAMILY MEDICINE

## 2022-05-14 LAB
BUN SERPL-MCNC: 24 MG/DL (ref 10–36)
BUN/CREAT SERPL: 15 (ref 10–24)
CALCIUM SERPL-MCNC: 9.4 MG/DL (ref 8.6–10.2)
CHLORIDE SERPL-SCNC: 101 MMOL/L (ref 96–106)
CO2 SERPL-SCNC: 21 MMOL/L (ref 20–29)
CREAT SERPL-MCNC: 1.57 MG/DL (ref 0.76–1.27)
EGFRCR SERPLBLD CKD-EPI 2021: 41 ML/MIN/1.73
GLUCOSE SERPL-MCNC: 96 MG/DL (ref 65–99)
POTASSIUM SERPL-SCNC: 4.3 MMOL/L (ref 3.5–5.2)
SODIUM SERPL-SCNC: 142 MMOL/L (ref 134–144)

## 2022-06-07 ENCOUNTER — OFFICE VISIT (OUTPATIENT)
Dept: FAMILY MEDICINE CLINIC | Facility: CLINIC | Age: 87
End: 2022-06-07

## 2022-06-07 VITALS
OXYGEN SATURATION: 98 % | WEIGHT: 183 LBS | HEART RATE: 76 BPM | BODY MASS INDEX: 27.11 KG/M2 | SYSTOLIC BLOOD PRESSURE: 136 MMHG | HEIGHT: 69 IN | DIASTOLIC BLOOD PRESSURE: 70 MMHG

## 2022-06-07 DIAGNOSIS — E03.9 ACQUIRED HYPOTHYROIDISM: ICD-10-CM

## 2022-06-07 DIAGNOSIS — R60.9 EDEMA, UNSPECIFIED TYPE: Primary | ICD-10-CM

## 2022-06-07 DIAGNOSIS — I48.91 ATRIAL FIBRILLATION, UNSPECIFIED TYPE: ICD-10-CM

## 2022-06-07 LAB — INR PPP: 2.4 (ref 0.9–1.1)

## 2022-06-07 PROCEDURE — 36415 COLL VENOUS BLD VENIPUNCTURE: CPT | Performed by: FAMILY MEDICINE

## 2022-06-07 PROCEDURE — 85610 PROTHROMBIN TIME: CPT | Performed by: FAMILY MEDICINE

## 2022-06-07 PROCEDURE — 36416 COLLJ CAPILLARY BLOOD SPEC: CPT | Performed by: FAMILY MEDICINE

## 2022-06-07 PROCEDURE — 99214 OFFICE O/P EST MOD 30 MIN: CPT | Performed by: FAMILY MEDICINE

## 2022-06-07 RX ORDER — AMLODIPINE BESYLATE 10 MG/1
10 TABLET ORAL DAILY
Qty: 90 TABLET | Refills: 3 | Status: SHIPPED | OUTPATIENT
Start: 2022-06-07 | End: 2022-09-30 | Stop reason: SDUPTHER

## 2022-06-09 LAB
ALBUMIN SERPL-MCNC: 4.4 G/DL (ref 3.5–4.6)
ALBUMIN/GLOB SERPL: 2.1 {RATIO} (ref 1.2–2.2)
ALP SERPL-CCNC: 108 IU/L (ref 44–121)
ALT SERPL-CCNC: 13 IU/L (ref 0–44)
AST SERPL-CCNC: 24 IU/L (ref 0–40)
BILIRUB SERPL-MCNC: 1.5 MG/DL (ref 0–1.2)
BUN SERPL-MCNC: 26 MG/DL (ref 10–36)
BUN/CREAT SERPL: 16 (ref 10–24)
CALCIUM SERPL-MCNC: 10.1 MG/DL (ref 8.6–10.2)
CHLORIDE SERPL-SCNC: 102 MMOL/L (ref 96–106)
CO2 SERPL-SCNC: 20 MMOL/L (ref 20–29)
CREAT SERPL-MCNC: 1.58 MG/DL (ref 0.76–1.27)
EGFRCR SERPLBLD CKD-EPI 2021: 41 ML/MIN/1.73
GLOBULIN SER CALC-MCNC: 2.1 G/DL (ref 1.5–4.5)
GLUCOSE SERPL-MCNC: 102 MG/DL (ref 65–99)
POTASSIUM SERPL-SCNC: 4.4 MMOL/L (ref 3.5–5.2)
PROT SERPL-MCNC: 6.5 G/DL (ref 6–8.5)
SODIUM SERPL-SCNC: 143 MMOL/L (ref 134–144)
TSH SERPL DL<=0.005 MIU/L-ACNC: 4.99 UIU/ML (ref 0.45–4.5)

## 2022-06-10 NOTE — PROGRESS NOTES
Follow Up Office Visit      Date of Visit:  2022   Patient Name: Tanner Douglas Jr.  : 1929   MRN: [id number]     Chief Complaint:    Chief Complaint   Patient presents with   • Edema     FOLLOW UP       History of Present Illness: Tanner Douglas Jr. is a 89 y.o. male who is here today for follow up.  Patient coming for follow-up on edema.  Some less severe.  Some swelling noted still.  Weight though down about 5 pounds.        Subjective      Review of Systems:   Review of Systems   Constitutional: Negative for fatigue and fever.   HENT: Negative for congestion and ear pain.    Respiratory: Negative for apnea, cough, chest tightness and shortness of breath.    Cardiovascular: Negative for chest pain.   Gastrointestinal: Negative for abdominal pain, constipation, diarrhea and nausea.   Musculoskeletal: Negative for arthralgias.   Psychiatric/Behavioral: Negative for depressed mood and stress.       Past Medical History:   Past Medical History:   Diagnosis Date   • A-fib (HCC)     PAROXYSMAL   • Basal cell carcinoma    • Benign hypertension    • CAD (coronary artery disease)    • Chronic anticoagulation    • Chronic gout without tophus    • Colon perforation (HCC)    • Coronary artery disease     STRESS TEST PROBABLY NUCLEAR   • Edema     BILATERAL LOWER LIMB   • Gout    • Hard of hearing     BILATERAL   • High risk medication use    • History of use of hearing aid in both ears    • Hyperlipidemia    • Hypertension    • Hypothyroid    • Thyroid disease        Past Surgical History:   Past Surgical History:   Procedure Laterality Date   • ABDOMINAL SURGERY     • ANGIOPLASTY      CORONARY ARTERY DISEASE  AND    • BASAL CELL CARCINOMA EXCISION     • CHOLECYSTECTOMY     • COLON RESECTION SMALL BOWEL     • ERCP N/A 2018    Procedure: ENDOSCOPIC RETROGRADE CHOLANGIOPANCREATOGRAPHY;  Surgeon: Wilfrid Justin MD;  Location: [facility] ENDOSCOPY;  Service:    • HERNIA REPAIR     • KNEE  "SURGERY     • PROSTATE SURGERY  2004       Family History: No family history on file.    Social History:   Social History     Socioeconomic History   • Marital status:    Tobacco Use   • Smoking status: Never Smoker   • Smokeless tobacco: Never Used   Substance and Sexual Activity   • Alcohol use: No   • Drug use: No       Medications:     Current Outpatient Medications:   •  amLODIPine (NORVASC) 10 MG tablet, Take 1 tablet by mouth Daily., Disp: 90 tablet, Rfl: 3  •  allopurinol (ZYLOPRIM) 100 MG tablet, TAKE ONE TABLET BY MOUTH DAILY, Disp: 90 tablet, Rfl: 0  •  aspirin 81 MG EC tablet, Take 81 mg by mouth Daily., Disp: , Rfl:   •  furosemide (LASIX) 20 MG tablet, Take 2 tablets by mouth Daily., Disp: 180 tablet, Rfl: 1  •  levothyroxine (SYNTHROID, LEVOTHROID) 50 MCG tablet, Take 1 tablet by mouth Daily., Disp: 90 tablet, Rfl: 1  •  metoprolol tartrate (LOPRESSOR) 50 MG tablet, Take 50 mg by mouth Daily., Disp: , Rfl:   •  Multiple Vitamins-Minerals (MULTIVITAMIN PO), Take 1 tablet by mouth Daily., Disp: , Rfl:   •  pantoprazole (PROTONIX) 40 MG EC tablet, Take 40 mg by mouth Daily., Disp: , Rfl:   •  potassium chloride (K-DUR,KLOR-CON) 20 MEQ CR tablet, Take 1 tablet by mouth 2 (Two) Times a Day., Disp: 180 tablet, Rfl: 1  •  promethazine (PHENERGAN) 25 MG tablet, Take 25 mg by mouth Every 6 (Six) Hours As Needed for Nausea or Vomiting., Disp: , Rfl:   •  terazosin (HYTRIN) 5 MG capsule, Take 1 capsule by mouth every night at bedtime., Disp: 90 capsule, Rfl: 1  •  warfarin (COUMADIN) 2.5 MG tablet, Take 2.5 mg by mouth Daily., Disp: , Rfl:     Allergies:   Allergies   Allergen Reactions   • Morphine And Related Delirium   • Penicillins Swelling       Objective     Physical Exam:  Vital Signs:   Vitals:    06/07/22 1450   BP: 136/70   Pulse: 76   SpO2: 98%   Weight: 83 kg (183 lb)   Height: 175.3 cm (69\")     Body mass index is 27.02 kg/m².     Physical Exam  Vitals and nursing note reviewed. "   Constitutional:       General: He is not in acute distress.     Appearance: Normal appearance. He is not ill-appearing.   HENT:      Head: Normocephalic and atraumatic.      Right Ear: Tympanic membrane and ear canal normal.      Left Ear: Tympanic membrane and ear canal normal.      Nose: Nose normal.   Cardiovascular:      Rate and Rhythm: Normal rate and regular rhythm.      Heart sounds: Normal heart sounds.   Pulmonary:      Effort: Pulmonary effort is normal.      Breath sounds: Normal breath sounds.   Neurological:      Mental Status: He is alert and oriented to person, place, and time. Mental status is at baseline.   Psychiatric:         Mood and Affect: Mood normal.         Procedures           Assessment / Plan      Assessment/Plan:   Diagnoses and all orders for this visit:    1. Edema, unspecified type (Primary)  -     Comprehensive metabolic panel; Future  -     Comprehensive metabolic panel    2. Acquired hypothyroidism  -     TSH; Future  -     TSH    3. Atrial fibrillation, unspecified type (HCC)  -     POCT INR    Other orders  -     amLODIPine (NORVASC) 10 MG tablet; Take 1 tablet by mouth Daily.  Dispense: 90 tablet; Refill: 3         Weight down about 5 pounds.  Edema improved.  Blood work did not show worsening of renal insufficiency.  Patient is due for recheck of his thyroid.  Not able to really wear the support stockings very well.    Follow Up:   No follow-ups on file.    Kelechi Calvert  [facility] [place]

## 2022-06-30 ENCOUNTER — PATIENT MESSAGE (OUTPATIENT)
Dept: FAMILY MEDICINE CLINIC | Facility: CLINIC | Age: 87
End: 2022-06-30

## 2022-06-30 DIAGNOSIS — R73.9 HYPERGLYCEMIA: ICD-10-CM

## 2022-06-30 DIAGNOSIS — I10 ESSENTIAL HYPERTENSION: Primary | ICD-10-CM

## 2022-07-07 ENCOUNTER — TELEPHONE (OUTPATIENT)
Dept: FAMILY MEDICINE CLINIC | Facility: CLINIC | Age: 87
End: 2022-07-07

## 2022-07-07 DIAGNOSIS — R60.9 EDEMA, UNSPECIFIED TYPE: Primary | ICD-10-CM

## 2022-07-07 DIAGNOSIS — I10 ESSENTIAL HYPERTENSION: ICD-10-CM

## 2022-07-07 NOTE — TELEPHONE ENCOUNTER
PATIENT DAUGHTER CALLED, SAID THAT SHE RECEIVED A MESSAGE ABOUT PATIENT HAVING SOME LABS DONE. PATIENT IS WANTING TO COME IN TOMORROW TO HAVE THOSE LABS DONE.

## 2022-07-08 ENCOUNTER — LAB (OUTPATIENT)
Dept: FAMILY MEDICINE CLINIC | Facility: CLINIC | Age: 87
End: 2022-07-08

## 2022-07-08 DIAGNOSIS — R73.9 HYPERGLYCEMIA: ICD-10-CM

## 2022-07-08 DIAGNOSIS — I48.20 CHRONIC ATRIAL FIBRILLATION: Primary | ICD-10-CM

## 2022-07-08 DIAGNOSIS — I10 ESSENTIAL HYPERTENSION: ICD-10-CM

## 2022-07-08 LAB — INR PPP: 3 (ref 0.9–1.1)

## 2022-07-08 PROCEDURE — 85610 PROTHROMBIN TIME: CPT | Performed by: FAMILY MEDICINE

## 2022-07-08 PROCEDURE — 36416 COLLJ CAPILLARY BLOOD SPEC: CPT | Performed by: FAMILY MEDICINE

## 2022-07-08 PROCEDURE — 36415 COLL VENOUS BLD VENIPUNCTURE: CPT | Performed by: FAMILY MEDICINE

## 2022-07-09 LAB
ALBUMIN SERPL-MCNC: 4.5 G/DL (ref 3.5–4.6)
ALBUMIN/GLOB SERPL: 2.6 {RATIO} (ref 1.2–2.2)
ALP SERPL-CCNC: 100 IU/L (ref 44–121)
ALT SERPL-CCNC: 17 IU/L (ref 0–44)
AST SERPL-CCNC: 22 IU/L (ref 0–40)
BASOPHILS # BLD AUTO: 0.2 X10E3/UL (ref 0–0.2)
BASOPHILS NFR BLD AUTO: 1 %
BILIRUB SERPL-MCNC: 1.4 MG/DL (ref 0–1.2)
BUN SERPL-MCNC: 35 MG/DL (ref 10–36)
BUN/CREAT SERPL: 19 (ref 10–24)
CALCIUM SERPL-MCNC: 9.6 MG/DL (ref 8.6–10.2)
CHLORIDE SERPL-SCNC: 105 MMOL/L (ref 96–106)
CO2 SERPL-SCNC: 25 MMOL/L (ref 20–29)
CREAT SERPL-MCNC: 1.81 MG/DL (ref 0.76–1.27)
EGFRCR SERPLBLD CKD-EPI 2021: 34 ML/MIN/1.73
EOSINOPHIL # BLD AUTO: 0.3 X10E3/UL (ref 0–0.4)
EOSINOPHIL NFR BLD AUTO: 2 %
ERYTHROCYTE [DISTWIDTH] IN BLOOD BY AUTOMATED COUNT: 15.7 % (ref 11.6–15.4)
GLOBULIN SER CALC-MCNC: 1.7 G/DL (ref 1.5–4.5)
GLUCOSE SERPL-MCNC: 88 MG/DL (ref 65–99)
HBA1C MFR BLD: 5.6 % (ref 4.8–5.6)
HCT VFR BLD AUTO: 49.1 % (ref 37.5–51)
HGB BLD-MCNC: 16 G/DL (ref 13–17.7)
IMM GRANULOCYTES # BLD AUTO: 0.1 X10E3/UL (ref 0–0.1)
IMM GRANULOCYTES NFR BLD AUTO: 1 %
LYMPHOCYTES # BLD AUTO: 0.8 X10E3/UL (ref 0.7–3.1)
LYMPHOCYTES NFR BLD AUTO: 6 %
MCH RBC QN AUTO: 27.1 PG (ref 26.6–33)
MCHC RBC AUTO-ENTMCNC: 32.6 G/DL (ref 31.5–35.7)
MCV RBC AUTO: 83 FL (ref 79–97)
MONOCYTES # BLD AUTO: 0.7 X10E3/UL (ref 0.1–0.9)
MONOCYTES NFR BLD AUTO: 6 %
NEUTROPHILS # BLD AUTO: 10.5 X10E3/UL (ref 1.4–7)
NEUTROPHILS NFR BLD AUTO: 84 %
PLATELET # BLD AUTO: 471 X10E3/UL (ref 150–450)
POTASSIUM SERPL-SCNC: 5.3 MMOL/L (ref 3.5–5.2)
PROT SERPL-MCNC: 6.2 G/DL (ref 6–8.5)
RBC # BLD AUTO: 5.91 X10E6/UL (ref 4.14–5.8)
SODIUM SERPL-SCNC: 145 MMOL/L (ref 134–144)
WBC # BLD AUTO: 12.5 X10E3/UL (ref 3.4–10.8)

## 2022-07-29 ENCOUNTER — OFFICE VISIT (OUTPATIENT)
Dept: FAMILY MEDICINE CLINIC | Facility: CLINIC | Age: 87
End: 2022-07-29

## 2022-07-29 VITALS
DIASTOLIC BLOOD PRESSURE: 70 MMHG | BODY MASS INDEX: 26.43 KG/M2 | SYSTOLIC BLOOD PRESSURE: 134 MMHG | WEIGHT: 179 LBS | HEART RATE: 83 BPM | OXYGEN SATURATION: 97 %

## 2022-07-29 DIAGNOSIS — C44.90 SKIN CANCER: Primary | ICD-10-CM

## 2022-07-29 DIAGNOSIS — D48.9 NEOPLASM OF UNCERTAIN BEHAVIOR: ICD-10-CM

## 2022-07-29 PROCEDURE — 11107 INCAL BX SKN EA SEP/ADDL: CPT | Performed by: FAMILY MEDICINE

## 2022-07-29 PROCEDURE — 11106 INCAL BX SKN SINGLE LES: CPT | Performed by: FAMILY MEDICINE

## 2022-07-31 NOTE — PROGRESS NOTES
Follow Up Office Visit      Date of Visit:  2022   Patient Name: Tanner Douglas Jr.  : 1929   MRN: [id number]     Chief Complaint:  No chief complaint on file.      History of Present Illness: Tanner Douglas Jr. is a 89 y.o. male who is here today for follow up.  Patient comes in today for removal of 2 skin cancers that were previously discussed.  With lesions have recently changed and become larger.  Nonhealing.        Subjective      Review of Systems:   Review of Systems   Constitutional: Negative for fatigue and fever.   HENT: Negative for congestion and ear pain.    Respiratory: Negative for apnea, cough, chest tightness and shortness of breath.    Cardiovascular: Negative for chest pain.   Gastrointestinal: Negative for abdominal pain, constipation, diarrhea and nausea.   Musculoskeletal: Negative for arthralgias.   Skin: Positive for skin lesions.   Psychiatric/Behavioral: Negative for depressed mood and stress.       Past Medical History:   Past Medical History:   Diagnosis Date   • A-fib (HCC)     PAROXYSMAL   • Basal cell carcinoma    • Benign hypertension    • CAD (coronary artery disease)    • Chronic anticoagulation    • Chronic gout without tophus    • Colon perforation (HCC)    • Coronary artery disease     STRESS TEST PROBABLY NUCLEAR   • Edema     BILATERAL LOWER LIMB   • Gout    • Hard of hearing     BILATERAL   • High risk medication use    • History of use of hearing aid in both ears    • Hyperlipidemia    • Hypertension    • Hypothyroid    • Thyroid disease        Past Surgical History:   Past Surgical History:   Procedure Laterality Date   • ABDOMINAL SURGERY     • ANGIOPLASTY      CORONARY ARTERY DISEASE  AND    • BASAL CELL CARCINOMA EXCISION     • CHOLECYSTECTOMY     • COLON RESECTION SMALL BOWEL     • ERCP N/A 2018    Procedure: ENDOSCOPIC RETROGRADE CHOLANGIOPANCREATOGRAPHY;  Surgeon: Wilfrid Justin MD;  Location: [facility] ENDOSCOPY;  Service:    •  HERNIA REPAIR     • KNEE SURGERY     • PROSTATE SURGERY  2004       Family History: No family history on file.    Social History:   Social History     Socioeconomic History   • Marital status:    Tobacco Use   • Smoking status: Never Smoker   • Smokeless tobacco: Never Used   Substance and Sexual Activity   • Alcohol use: No   • Drug use: No       Medications:     Current Outpatient Medications:   •  allopurinol (ZYLOPRIM) 100 MG tablet, TAKE ONE TABLET BY MOUTH DAILY, Disp: 90 tablet, Rfl: 0  •  amLODIPine (NORVASC) 10 MG tablet, Take 1 tablet by mouth Daily., Disp: 90 tablet, Rfl: 3  •  aspirin 81 MG EC tablet, Take 81 mg by mouth Daily., Disp: , Rfl:   •  furosemide (LASIX) 20 MG tablet, Take 2 tablets by mouth Daily., Disp: 180 tablet, Rfl: 1  •  levothyroxine (SYNTHROID, LEVOTHROID) 50 MCG tablet, Take 1 tablet by mouth Daily., Disp: 90 tablet, Rfl: 1  •  metoprolol tartrate (LOPRESSOR) 50 MG tablet, Take 50 mg by mouth Daily., Disp: , Rfl:   •  Multiple Vitamins-Minerals (MULTIVITAMIN PO), Take 1 tablet by mouth Daily., Disp: , Rfl:   •  pantoprazole (PROTONIX) 40 MG EC tablet, Take 40 mg by mouth Daily., Disp: , Rfl:   •  potassium chloride (K-DUR,KLOR-CON) 20 MEQ CR tablet, Take 1 tablet by mouth 2 (Two) Times a Day., Disp: 180 tablet, Rfl: 1  •  promethazine (PHENERGAN) 25 MG tablet, Take 25 mg by mouth Every 6 (Six) Hours As Needed for Nausea or Vomiting., Disp: , Rfl:   •  terazosin (HYTRIN) 5 MG capsule, Take 1 capsule by mouth every night at bedtime., Disp: 90 capsule, Rfl: 1  •  warfarin (COUMADIN) 2.5 MG tablet, Take 2.5 mg by mouth Daily., Disp: , Rfl:     Allergies:   Allergies   Allergen Reactions   • Morphine And Related Delirium   • Penicillins Swelling       Objective     Physical Exam:  Vital Signs:   Vitals:    07/29/22 1106   BP: 134/70   Pulse: 83   SpO2: 97%   Weight: 81.2 kg (179 lb)     Body mass index is 26.43 kg/m².     Physical Exam  Vitals and nursing note reviewed.    Constitutional:       General: He is not in acute distress.     Appearance: Normal appearance. He is not ill-appearing.   HENT:      Head: Normocephalic and atraumatic.      Right Ear: Tympanic membrane and ear canal normal.      Left Ear: Tympanic membrane and ear canal normal.      Nose: Nose normal.   Cardiovascular:      Rate and Rhythm: Normal rate and regular rhythm.      Heart sounds: Normal heart sounds.   Pulmonary:      Effort: Pulmonary effort is normal.      Breath sounds: Normal breath sounds.   Skin:     Comments: 2 skin lesions   Neurological:      Mental Status: He is alert and oriented to person, place, and time. Mental status is at baseline.   Psychiatric:         Mood and Affect: Mood normal.         Biopsy    Date/Time: 7/31/2022 4:17 PM  Performed by: Kelechi Calvert MD  Authorized by: Kelechi Calvert MD     Procedure Details - Skin Biopsy:     Body area: head/neck    Head/neck location: R cheek    Initial size (mm): 4    Final defect size (mm): 6    Malignancy: malignant lesion      Destruction method: shave biopsy    Biopsy    Date/Time: 7/31/2022 4:17 PM  Performed by: Kelechi Calvert MD  Authorized by: Kelechi Calvert MD     Procedure Details - Skin Biopsy:     Body area: lower extremity    Lower extremity location: R lower leg    Initial size (mm): 25    Final defect size (mm): 30    Malignancy: malignant lesion      Destruction method: shave biopsy            Assessment / Plan      Assessment/Plan:   Diagnoses and all orders for this visit:    1. Skin cancer (Primary)    2. Neoplasm of uncertain behavior  -     Reference Histopathology; Future  -     Reference Histopathology    Other orders  -     Biopsy  -     Biopsy         2 lesions removed.  One was on the leg and the other was on the face.  Both appear to be a small skin cancer.  We will await pathology.    Follow Up:   No follow-ups on file.    Kelechi Calvert  [facility] [place]

## 2022-08-02 LAB
DX ICD CODE: NORMAL
DX ICD CODE: NORMAL
PATH REPORT.FINAL DX SPEC: NORMAL
PATH REPORT.GROSS SPEC: NORMAL
PATH REPORT.SITE OF ORIGIN SPEC: NORMAL
PATHOLOGIST NAME: NORMAL
PAYMENT PROCEDURE: NORMAL

## 2022-08-04 RX ORDER — ALLOPURINOL 100 MG/1
TABLET ORAL
Qty: 90 TABLET | Refills: 0 | Status: SHIPPED | OUTPATIENT
Start: 2022-08-04 | End: 2022-09-30 | Stop reason: SDUPTHER

## 2022-08-04 RX ORDER — METOPROLOL TARTRATE 50 MG/1
TABLET, FILM COATED ORAL
Qty: 90 TABLET | Refills: 0 | Status: SHIPPED | OUTPATIENT
Start: 2022-08-04 | End: 2022-09-30 | Stop reason: SDUPTHER

## 2022-08-08 ENCOUNTER — TELEPHONE (OUTPATIENT)
Dept: FAMILY MEDICINE CLINIC | Facility: CLINIC | Age: 87
End: 2022-08-08

## 2022-08-08 NOTE — TELEPHONE ENCOUNTER
Caller: Mitzi Stock    Relationship to patient: Emergency Contact/DAUGHTER    Best call back number:     [phone]    Patient is needing:     CALLER TRIED TO MAKE CONTACT TO SCHEDULE A NURSE APPOINTMENT FOR PATIENT TO HAVE HIS INR CHECKED    CALLER REQUESTED AN APPOINTMENT FOR Wednesday, 8/10/22 IN A.M.    PLEASE CONTACT CALLER AT TELEPHONE NUMBER INCLUDED ABOVE    DR NICHOLS

## 2022-08-10 ENCOUNTER — CLINICAL SUPPORT (OUTPATIENT)
Dept: FAMILY MEDICINE CLINIC | Facility: CLINIC | Age: 87
End: 2022-08-10

## 2022-08-10 DIAGNOSIS — I48.91 ATRIAL FIBRILLATION, UNSPECIFIED TYPE: ICD-10-CM

## 2022-08-10 LAB — INR PPP: 2.3 (ref 2.5–3.5)

## 2022-08-10 PROCEDURE — 36416 COLLJ CAPILLARY BLOOD SPEC: CPT | Performed by: FAMILY MEDICINE

## 2022-08-10 PROCEDURE — 85610 PROTHROMBIN TIME: CPT | Performed by: FAMILY MEDICINE

## 2022-08-11 NOTE — PROGRESS NOTES
INR today was 2.3 and taking 2.5mg daily. Pt said he was off for 3 days due to lesions being removed. Advised to recheck in 1 month

## 2022-09-12 ENCOUNTER — CLINICAL SUPPORT (OUTPATIENT)
Dept: FAMILY MEDICINE CLINIC | Facility: CLINIC | Age: 87
End: 2022-09-12

## 2022-09-12 DIAGNOSIS — I48.91 ATRIAL FIBRILLATION, UNSPECIFIED TYPE: ICD-10-CM

## 2022-09-12 LAB — INR PPP: 2.2 (ref 0.9–1.1)

## 2022-09-12 PROCEDURE — 36416 COLLJ CAPILLARY BLOOD SPEC: CPT | Performed by: FAMILY MEDICINE

## 2022-09-12 PROCEDURE — 85610 PROTHROMBIN TIME: CPT | Performed by: FAMILY MEDICINE

## 2022-09-12 PROCEDURE — 93793 ANTICOAG MGMT PT WARFARIN: CPT | Performed by: FAMILY MEDICINE

## 2022-09-30 ENCOUNTER — OFFICE VISIT (OUTPATIENT)
Dept: FAMILY MEDICINE CLINIC | Facility: CLINIC | Age: 87
End: 2022-09-30

## 2022-09-30 VITALS — SYSTOLIC BLOOD PRESSURE: 128 MMHG | HEART RATE: 82 BPM | DIASTOLIC BLOOD PRESSURE: 90 MMHG | OXYGEN SATURATION: 96 %

## 2022-09-30 DIAGNOSIS — E03.9 ACQUIRED HYPOTHYROIDISM: ICD-10-CM

## 2022-09-30 DIAGNOSIS — I10 ESSENTIAL HYPERTENSION: ICD-10-CM

## 2022-09-30 DIAGNOSIS — E78.5 HYPERLIPIDEMIA, UNSPECIFIED HYPERLIPIDEMIA TYPE: ICD-10-CM

## 2022-09-30 DIAGNOSIS — I48.91 ATRIAL FIBRILLATION, UNSPECIFIED TYPE: Primary | ICD-10-CM

## 2022-09-30 DIAGNOSIS — Z23 FLU VACCINE NEED: ICD-10-CM

## 2022-09-30 DIAGNOSIS — I48.20 CHRONIC ATRIAL FIBRILLATION: ICD-10-CM

## 2022-09-30 DIAGNOSIS — R60.9 EDEMA, UNSPECIFIED TYPE: ICD-10-CM

## 2022-09-30 PROCEDURE — G0008 ADMIN INFLUENZA VIRUS VAC: HCPCS | Performed by: FAMILY MEDICINE

## 2022-09-30 PROCEDURE — 90662 IIV NO PRSV INCREASED AG IM: CPT | Performed by: FAMILY MEDICINE

## 2022-09-30 PROCEDURE — 99214 OFFICE O/P EST MOD 30 MIN: CPT | Performed by: FAMILY MEDICINE

## 2022-09-30 RX ORDER — AMLODIPINE BESYLATE 10 MG/1
10 TABLET ORAL DAILY
Qty: 90 TABLET | Refills: 1 | Status: SHIPPED | OUTPATIENT
Start: 2022-09-30

## 2022-09-30 RX ORDER — TERAZOSIN 5 MG/1
5 CAPSULE ORAL
Qty: 90 CAPSULE | Refills: 1 | Status: SHIPPED | OUTPATIENT
Start: 2022-09-30 | End: 2023-09-30

## 2022-09-30 RX ORDER — LEVOTHYROXINE SODIUM 0.05 MG/1
50 TABLET ORAL DAILY
Qty: 90 TABLET | Refills: 1 | Status: SHIPPED | OUTPATIENT
Start: 2022-09-30

## 2022-09-30 RX ORDER — POTASSIUM CHLORIDE 20 MEQ/1
20 TABLET, EXTENDED RELEASE ORAL 2 TIMES DAILY
Qty: 180 TABLET | Refills: 1 | Status: SHIPPED | OUTPATIENT
Start: 2022-09-30 | End: 2022-10-04

## 2022-09-30 RX ORDER — METOPROLOL TARTRATE 50 MG/1
50 TABLET, FILM COATED ORAL 2 TIMES DAILY
Qty: 180 TABLET | Refills: 1 | Status: SHIPPED | OUTPATIENT
Start: 2022-09-30 | End: 2022-10-26 | Stop reason: ALTCHOICE

## 2022-09-30 RX ORDER — ALLOPURINOL 100 MG/1
100 TABLET ORAL DAILY
Qty: 90 TABLET | Refills: 1 | Status: SHIPPED | OUTPATIENT
Start: 2022-09-30

## 2022-09-30 RX ORDER — FUROSEMIDE 20 MG/1
40 TABLET ORAL DAILY
Qty: 180 TABLET | Refills: 1 | Status: SHIPPED | OUTPATIENT
Start: 2022-09-30 | End: 2023-03-15 | Stop reason: SDUPTHER

## 2022-09-30 NOTE — PROGRESS NOTES
Follow Up Office Visit      Date of Visit:  2022   Patient Name: Tanner Douglas Jr.  : 1929   MRN: [id number]     Chief Complaint:    Chief Complaint   Patient presents with   • toenail Trim       History of Present Illness: Tanner Douglas Jr. is a 89 y.o. male who is here today for follow up.  Patient comes in for medication refills on his chronic medical conditions.  Reviewed past labs.  Conditions are stable.  Medication refills given.  Also trimmed patient's toenails while he was here.        Subjective      Review of Systems:   Review of Systems   Constitutional: Negative for fatigue and fever.   HENT: Negative for congestion and ear pain.    Respiratory: Negative for apnea, cough, chest tightness and shortness of breath.    Cardiovascular: Negative for chest pain.   Gastrointestinal: Negative for abdominal pain, constipation, diarrhea and nausea.   Musculoskeletal: Negative for arthralgias.   Psychiatric/Behavioral: Negative for depressed mood and stress.       Past Medical History:   Past Medical History:   Diagnosis Date   • A-fib (HCC)     PAROXYSMAL   • Basal cell carcinoma    • Benign hypertension    • CAD (coronary artery disease)    • Chronic anticoagulation    • Chronic gout without tophus    • Colon perforation (HCC)    • Coronary artery disease 2007    STRESS TEST PROBABLY NUCLEAR   • Edema     BILATERAL LOWER LIMB   • Gout    • Hard of hearing     BILATERAL   • High risk medication use    • History of use of hearing aid in both ears    • Hyperlipidemia    • Hypertension    • Hypothyroid    • Thyroid disease        Past Surgical History:   Past Surgical History:   Procedure Laterality Date   • ABDOMINAL SURGERY     • ANGIOPLASTY      CORONARY ARTERY DISEASE  AND    • BASAL CELL CARCINOMA EXCISION     • CHOLECYSTECTOMY     • COLON RESECTION SMALL BOWEL     • ERCP N/A 2018    Procedure: ENDOSCOPIC RETROGRADE CHOLANGIOPANCREATOGRAPHY;  Surgeon: Wilfrid Justin MD;   Location: [facility] ENDOSCOPY;  Service:    • HERNIA REPAIR     • KNEE SURGERY     • PROSTATE SURGERY  2004       Family History: No family history on file.    Social History:   Social History     Socioeconomic History   • Marital status:    Tobacco Use   • Smoking status: Never Smoker   • Smokeless tobacco: Never Used   Substance and Sexual Activity   • Alcohol use: No   • Drug use: No       Medications:     Current Outpatient Medications:   •  allopurinol (ZYLOPRIM) 100 MG tablet, Take 1 tablet by mouth Daily., Disp: 90 tablet, Rfl: 1  •  amLODIPine (NORVASC) 10 MG tablet, Take 1 tablet by mouth Daily., Disp: 90 tablet, Rfl: 1  •  furosemide (LASIX) 20 MG tablet, Take 2 tablets by mouth Daily., Disp: 180 tablet, Rfl: 1  •  levothyroxine (SYNTHROID, LEVOTHROID) 50 MCG tablet, Take 1 tablet by mouth Daily., Disp: 90 tablet, Rfl: 1  •  metoprolol tartrate (LOPRESSOR) 50 MG tablet, Take 1 tablet by mouth 2 (Two) Times a Day., Disp: 180 tablet, Rfl: 1  •  potassium chloride (K-DUR,KLOR-CON) 20 MEQ CR tablet, Take 1 tablet by mouth 2 (Two) Times a Day., Disp: 180 tablet, Rfl: 1  •  terazosin (HYTRIN) 5 MG capsule, Take 1 capsule by mouth every night at bedtime., Disp: 90 capsule, Rfl: 1  •  aspirin 81 MG EC tablet, Take 81 mg by mouth Daily., Disp: , Rfl:   •  Multiple Vitamins-Minerals (MULTIVITAMIN PO), Take 1 tablet by mouth Daily., Disp: , Rfl:   •  pantoprazole (PROTONIX) 40 MG EC tablet, Take 40 mg by mouth Daily., Disp: , Rfl:   •  promethazine (PHENERGAN) 25 MG tablet, Take 25 mg by mouth Every 6 (Six) Hours As Needed for Nausea or Vomiting., Disp: , Rfl:   •  warfarin (COUMADIN) 2.5 MG tablet, Take 2.5 mg by mouth Daily., Disp: , Rfl:     Allergies:   Allergies   Allergen Reactions   • Morphine And Related Delirium   • Penicillins Swelling       Objective     Physical Exam:  Vital Signs:   Vitals:    09/30/22 0947   BP: 128/90   Pulse: 82   SpO2: 96%     There is no height or weight on file to calculate  BMI.     Physical Exam  Vitals and nursing note reviewed.   Constitutional:       General: He is not in acute distress.     Appearance: Normal appearance. He is not ill-appearing.   HENT:      Head: Normocephalic and atraumatic.      Right Ear: Tympanic membrane and ear canal normal.      Left Ear: Tympanic membrane and ear canal normal.      Nose: Nose normal.   Cardiovascular:      Rate and Rhythm: Normal rate and regular rhythm.      Heart sounds: Normal heart sounds.   Pulmonary:      Effort: Pulmonary effort is normal.      Breath sounds: Normal breath sounds.   Skin:     Comments: Very dystrophic toenails   Neurological:      Mental Status: He is alert and oriented to person, place, and time. Mental status is at baseline.   Psychiatric:         Mood and Affect: Mood normal.         Procedures      Assessment / Plan      Assessment/Plan:   Diagnoses and all orders for this visit:    1. Atrial fibrillation, unspecified type (HCC) (Primary)    2. Flu vaccine need    3. Essential hypertension    4. Hyperlipidemia, unspecified hyperlipidemia type    5. Acquired hypothyroidism    6. Chronic atrial fibrillation (HCC)    7. Edema, unspecified type    Other orders  -     Fluzone High-Dose 65+yrs (5858-5505)  -     allopurinol (ZYLOPRIM) 100 MG tablet; Take 1 tablet by mouth Daily.  Dispense: 90 tablet; Refill: 1  -     amLODIPine (NORVASC) 10 MG tablet; Take 1 tablet by mouth Daily.  Dispense: 90 tablet; Refill: 1  -     furosemide (LASIX) 20 MG tablet; Take 2 tablets by mouth Daily.  Dispense: 180 tablet; Refill: 1  -     levothyroxine (SYNTHROID, LEVOTHROID) 50 MCG tablet; Take 1 tablet by mouth Daily.  Dispense: 90 tablet; Refill: 1  -     metoprolol tartrate (LOPRESSOR) 50 MG tablet; Take 1 tablet by mouth 2 (Two) Times a Day.  Dispense: 180 tablet; Refill: 1  -     terazosin (HYTRIN) 5 MG capsule; Take 1 capsule by mouth every night at bedtime.  Dispense: 90 capsule; Refill: 1  -     potassium chloride  (K-COREY,KLOR-CON) 20 MEQ CR tablet; Take 1 tablet by mouth 2 (Two) Times a Day.  Dispense: 180 tablet; Refill: 1  -     Nail Removal  -     Nail Removal         Medication refills given today.  Did also trim toenails.    Follow Up:   No follow-ups on file.    Kelechi Calvert  [facility] [place]

## 2022-10-04 RX ORDER — POTASSIUM CHLORIDE 1500 MG/1
TABLET, EXTENDED RELEASE ORAL
Qty: 180 TABLET | Refills: 1 | Status: SHIPPED | OUTPATIENT
Start: 2022-10-04

## 2022-10-13 ENCOUNTER — CLINICAL SUPPORT (OUTPATIENT)
Dept: FAMILY MEDICINE CLINIC | Facility: CLINIC | Age: 87
End: 2022-10-13

## 2022-10-13 DIAGNOSIS — I48.91 ATRIAL FIBRILLATION, UNSPECIFIED TYPE: ICD-10-CM

## 2022-10-13 LAB — INR PPP: 4 (ref 2–3)

## 2022-10-13 PROCEDURE — 85610 PROTHROMBIN TIME: CPT | Performed by: INTERNAL MEDICINE

## 2022-10-13 PROCEDURE — 36416 COLLJ CAPILLARY BLOOD SPEC: CPT | Performed by: INTERNAL MEDICINE

## 2022-10-13 PROCEDURE — 93793 ANTICOAG MGMT PT WARFARIN: CPT | Performed by: INTERNAL MEDICINE

## 2022-10-13 RX ORDER — WARFARIN SODIUM 2 MG/1
2 TABLET ORAL NIGHTLY
Qty: 30 TABLET | Refills: 0 | Status: SHIPPED | OUTPATIENT
Start: 2022-10-13 | End: 2022-11-17 | Stop reason: SDUPTHER

## 2022-10-13 NOTE — PROGRESS NOTES
Patient's INR 4.4, spoke with daughter who denies any recent antibiotic use , dietary changes, new medications.  She states he has been taking 2.5 mg daily per her knowledge but does wonder if perhaps he took 2 in 1 day.  Denies any excessive bleeding or bruising I recommended she have him changed to 2 mg of Coumadin once a day every day.  Starting with tonight's dose.  New prescription sent

## 2022-10-17 ENCOUNTER — NURSE TRIAGE (OUTPATIENT)
Dept: CALL CENTER | Facility: HOSPITAL | Age: 87
End: 2022-10-17

## 2022-10-17 NOTE — TELEPHONE ENCOUNTER
"    Reason for Disposition  • MODERATE longstanding difficulty breathing (e.g., speaks in phrases, SOB even at rest, pulse 100-120) and SAME as normal    Additional Information  • Negative: SEVERE difficulty breathing (e.g., struggling for each breath, speaks in single words, pulse > 120)  • Negative: Breathing stopped and hasn't returned  • Negative: Choking on something  • Negative: Bluish (or gray) lips or face  • Negative: Difficult to awaken or acting confused (e.g., disoriented, slurred speech)  • Negative: Passed out (i.e., fainted, collapsed and was not responding)  • Negative: Wheezing started suddenly after medicine, an allergic food, or bee sting  • Negative: Stridor  • Negative: Slow, shallow and weak breathing  • Negative: Sounds like a life-threatening emergency to the triager  • Negative: Chest pain  • Negative: Wheezing (high pitched whistling sound) and previous asthma attacks or use of asthma medicines  • Negative: Difficulty breathing and within 14 days of COVID-19 Exposure  • Negative: Difficulty breathing and only present when coughing  • Negative: Difficulty breathing and only from stuffy nose  • Negative: Difficulty breathing and only from stuffy nose or runny nose from common cold  • Negative: MODERATE difficulty breathing (e.g., speaks in phrases, SOB even at rest, pulse 100-120) of new-onset or worse than normal  • Negative: Oxygen level (e.g., pulse oximetry) 90 percent or lower  • Negative: Wheezing can be heard across the room  • Negative: Drooling or spitting out saliva (because can't swallow)  • Negative: Any history of prior \"blood clot\" in leg or lungs  • Negative: Illness requiring prolonged bedrest in past month (e.g., immobilization, long hospital stay)  • Negative: Hip or leg fracture (broken bone) in past month (or had cast on leg or ankle in past month)  • Negative: Major surgery in the past month  • Negative: Long-distance travel in past month (e.g., car, bus, train, plane; " "with trip lasting 6 or more hours)  • Negative: Cancer treatment in past six months (or has cancer now)  • Negative: Extra heart beats OR irregular heart beating (i.e., \"palpitations\")  • Negative: Fever > 103 F (39.4 C)  • Negative: Fever > 101 F (38.3 C) and over 60 years of age  • Negative: Fever > 100.0 F (37.8 C) and bedridden (e.g., nursing home patient, stroke, chronic illness, recovering from surgery)  • Negative: Fever > 100.0 F (37.8 C) and diabetes mellitus or weak immune system (e.g., HIV positive, cancer chemo, splenectomy, organ transplant, chronic steroids)  • Negative: Periods where breathing stops and then resumes normally and bedridden (e.g., nursing home patient, CVA)  • Negative: Pregnant or postpartum (from 0 to 6 weeks after delivery)  • Negative: Patient sounds very sick or weak to the triager  • Negative: MILD difficulty breathing (e.g., minimal/no SOB at rest, SOB with walking, pulse < 100) of new-onset or worse than normal  • Negative: Longstanding difficulty breathing (e.g., CHF, COPD, emphysema) and worse than normal  • Negative: Longstanding difficulty breathing and not responding to usual therapy  • Negative: Continuous (nonstop) coughing  • Negative: Patient wants to be seen  • Negative: Oxygen level (e.g., pulse oximetry) 91 to 94 percent  • Negative: MILD longstanding difficulty breathing (e.g., speaks in phrases, SOB even at rest, pulse 100-120) and SAME as normal    Answer Assessment - Initial Assessment Questions  1. RESPIRATORY STATUS: \"Describe your breathing?\" (e.g., wheezing, shortness of breath, unable to speak, severe coughing)       SOA at rest occasionally, has appt tomorrow  2. ONSET: \"When did this breathing problem begin?\"       Two weeks  3. PATTERN \"Does the difficult breathing come and go, or has it been constant since it started?\"       intermittent  4. SEVERITY: \"How bad is your breathing?\" (e.g., mild, moderate, severe)     - MILD: No SOB at rest, mild SOB with " "walking, speaks normally in sentences, can lie down, no retractions, pulse < 100.     - MODERATE: SOB at rest, SOB with minimal exertion and prefers to sit, cannot lie down flat, speaks in phrases, mild retractions, audible wheezing, pulse 100-120.     - SEVERE: Very SOB at rest, speaks in single words, struggling to breathe, sitting hunched forward, retractions, pulse > 120       Mild  5. RECURRENT SYMPTOM: \"Have you had difficulty breathing before?\" If Yes, ask: \"When was the last time?\" and \"What happened that time?\"       no  6. CARDIAC HISTORY: \"Do you have any history of heart disease?\" (e.g., heart attack, angina, bypass surgery, angioplasty)       AFib  7. LUNG HISTORY: \"Do you have any history of lung disease?\"  (e.g., pulmonary embolus, asthma, emphysema)      none  8. CAUSE: \"What do you think is causing the breathing problem?\"       Positive for pedal edema, has not been told has CHF  9. OTHER SYMPTOMS: \"Do you have any other symptoms? (e.g., dizziness, runny nose, cough, chest pain, fever)      Afib  10. O2 SATURATION MONITOR:  \"Do you use an oxygen saturation monitor (pulse oximeter) at home?\" If Yes, \"What is your reading (oxygen level) today?\" \"What is your usual oxygen saturation reading?\" (e.g., 95%)        SPO2 94 % at reast  11. PREGNANCY: \"Is there any chance you are pregnant?\" \"When was your last menstrual period?\"       NA  12. TRAVEL: \"Have you traveled out of the country in the last month?\" (e.g., travel history, exposures)        NA    Protocols used: BREATHING DIFFICULTY-ADULT-[place]      "

## 2022-10-18 ENCOUNTER — OFFICE VISIT (OUTPATIENT)
Dept: FAMILY MEDICINE CLINIC | Facility: CLINIC | Age: 87
End: 2022-10-18

## 2022-10-18 VITALS
BODY MASS INDEX: 27.25 KG/M2 | OXYGEN SATURATION: 91 % | HEIGHT: 69 IN | HEART RATE: 76 BPM | DIASTOLIC BLOOD PRESSURE: 66 MMHG | WEIGHT: 184 LBS | SYSTOLIC BLOOD PRESSURE: 110 MMHG

## 2022-10-18 DIAGNOSIS — R42 DIZZINESS: Primary | ICD-10-CM

## 2022-10-18 DIAGNOSIS — R06.00 DYSPNEA, UNSPECIFIED TYPE: ICD-10-CM

## 2022-10-18 DIAGNOSIS — R60.9 EDEMA, UNSPECIFIED TYPE: ICD-10-CM

## 2022-10-18 LAB — INR PPP: 4.1 (ref 2–3)

## 2022-10-18 PROCEDURE — 36416 COLLJ CAPILLARY BLOOD SPEC: CPT | Performed by: PHYSICIAN ASSISTANT

## 2022-10-18 PROCEDURE — 85610 PROTHROMBIN TIME: CPT | Performed by: PHYSICIAN ASSISTANT

## 2022-10-18 PROCEDURE — 99213 OFFICE O/P EST LOW 20 MIN: CPT | Performed by: PHYSICIAN ASSISTANT

## 2022-10-18 RX ORDER — POTASSIUM CHLORIDE 1500 MG/1
TABLET, FILM COATED, EXTENDED RELEASE ORAL
COMMUNITY
Start: 2022-10-06 | End: 2022-10-26 | Stop reason: ALTCHOICE

## 2022-10-18 NOTE — PROGRESS NOTES
"Chief Complaint  Wheezing (Wheezing been going on 2 weeks. Gets dizzy and check o2 levels been running high 80 sometimes and ruuns 92-93)    Subjective          Tannerkelsey Douglas Jr. presents to [facility]  History of Present Illness  Patient in today with son-in-law for concerns of bilateral lower leg edema as well as some increased episodes of wheezing and feeling short of breath at times on exertion.  They have been monitoring his O2 levels and they have been fluctuating with lows in the high 80s, up to 93%. Denies fever. No significant coughing. Denies chest pain. Admits has not been drinking very many fluids. He states is still urinating. Denies vomiting or diarrhea. Also states has been having some dizzy spells past several days.   Wheezing   Associated symptoms include shortness of breath. Pertinent negatives include no abdominal pain, chest pain, coughing, diarrhea, fever, headaches, sore throat or vomiting.       Objective   Vital Signs:   /66 (BP Location: Left arm, Patient Position: Sitting, Cuff Size: Large Adult)   Pulse 76   Ht 175.3 cm (69\")   Wt 83.5 kg (184 lb)   SpO2 91%   BMI 27.17 kg/m²     Body mass index is 27.17 kg/m².    Review of Systems   Constitutional: Negative for fever.   HENT: Negative for congestion and sore throat.    Respiratory: Positive for shortness of breath and wheezing. Negative for cough.    Cardiovascular: Positive for leg swelling. Negative for chest pain and palpitations.   Gastrointestinal: Negative for abdominal pain, diarrhea, nausea and vomiting.   Genitourinary: Negative for dysuria and frequency.   Neurological: Positive for dizziness. Negative for headache.       Past History:  Medical History: has a past medical history of A-fib (HCC), Basal cell carcinoma, Benign hypertension, CAD (coronary artery disease), Chronic anticoagulation, Chronic gout without tophus, Colon perforation (HCC), Coronary artery disease (2007), Edema, " Gout, Hard of hearing, High risk medication use, History of use of hearing aid in both ears, Hyperlipidemia, Hypertension, Hypothyroid, and Thyroid disease.   Surgical History: has a past surgical history that includes Cholecystectomy; Hernia repair; Knee surgery; Small Bowel Resection; Abdominal surgery; ERCP (N/A, 02/03/2018); Excision basal cell carcinoma; Prostate surgery (2004); and Angioplasty.   Family History: family history is not on file.   Social History: reports that he has never smoked. He has never used smokeless tobacco. He reports that he does not drink alcohol and does not use drugs.      Current Outpatient Medications:   •  allopurinol (ZYLOPRIM) 100 MG tablet, Take 1 tablet by mouth Daily., Disp: 90 tablet, Rfl: 1  •  amLODIPine (NORVASC) 10 MG tablet, Take 1 tablet by mouth Daily., Disp: 90 tablet, Rfl: 1  •  furosemide (LASIX) 20 MG tablet, Take 2 tablets by mouth Daily., Disp: 180 tablet, Rfl: 1  •  KLOR-CON 20 MEQ CR tablet, TAKE ONE TABLET BY MOUTH TWICE A DAY, Disp: 180 tablet, Rfl: 1  •  levothyroxine (SYNTHROID, LEVOTHROID) 50 MCG tablet, Take 1 tablet by mouth Daily., Disp: 90 tablet, Rfl: 1  •  metoprolol tartrate (LOPRESSOR) 50 MG tablet, Take 1 tablet by mouth 2 (Two) Times a Day., Disp: 180 tablet, Rfl: 1  •  Multiple Vitamins-Minerals (MULTIVITAMIN PO), Take 1 tablet by mouth Daily., Disp: , Rfl:   •  potassium chloride ER (K-TAB) 20 MEQ tablet controlled-release ER tablet, , Disp: , Rfl:   •  terazosin (HYTRIN) 5 MG capsule, Take 1 capsule by mouth every night at bedtime., Disp: 90 capsule, Rfl: 1  •  warfarin (Coumadin) 2 MG tablet, Take 1 tablet by mouth Every Night., Disp: 30 tablet, Rfl: 0  •  aspirin 81 MG EC tablet, Take 81 mg by mouth Daily., Disp: , Rfl:   Allergies: Clavulanic acid, Clopidogrel bisulfate, Morphine and related, Naproxen, and Sulfa antibiotics    Physical Exam  Constitutional:       Appearance: Normal appearance.   HENT:      Right Ear: Tympanic membrane  normal.      Left Ear: Tympanic membrane normal.      Mouth/Throat:      Mouth: Mucous membranes are dry.   Eyes:      Extraocular Movements: Extraocular movements intact.      Right eye: No nystagmus.      Left eye: No nystagmus.      Pupils: Pupils are equal, round, and reactive to light.   Cardiovascular:      Rate and Rhythm: Regular rhythm.      Heart sounds: Normal heart sounds.   Pulmonary:      Effort: Pulmonary effort is normal.      Breath sounds: Normal breath sounds.   Musculoskeletal:      Right lower le+ Pitting Edema present.      Left lower le+ Pitting Edema present.   Neurological:      Mental Status: He is alert and oriented to person, place, and time.      Cranial Nerves: Cranial nerves 2-12 are intact.      Coordination: Coordination is intact.      Gait: Gait is intact.      Comments: Walks with walker.              Assessment and Plan   Diagnoses and all orders for this visit:    1. Dizziness (Primary)  With acute symptoms as such, recommend evaluation today in the ER. Patient wants to discuss with his wife and then will have family member to transport to ER. He does seem a bit dry in the mouth and  his pulse ox is staying lower than his normal . If he does not go to ER for evaluation, he will need to RTC here to get some outpatient labs/studies ordered.   2. Edema, unspecified type    3. Dyspnea, unspecified type    Other orders  -     POC INR            Follow Up   No follow-ups on file.  Patient was given instructions and counseling regarding his condition or for health maintenance advice. Please see specific information pulled into the AVS if appropriate.     Dasha De La Paz PA-C

## 2022-10-26 ENCOUNTER — OFFICE VISIT (OUTPATIENT)
Dept: FAMILY MEDICINE CLINIC | Facility: CLINIC | Age: 87
End: 2022-10-26

## 2022-10-26 VITALS
DIASTOLIC BLOOD PRESSURE: 60 MMHG | HEART RATE: 65 BPM | BODY MASS INDEX: 2575.7 KG/M2 | SYSTOLIC BLOOD PRESSURE: 100 MMHG | RESPIRATION RATE: 20 BRPM | OXYGEN SATURATION: 93 % | HEIGHT: 60 IN

## 2022-10-26 DIAGNOSIS — B37.0 THRUSH: ICD-10-CM

## 2022-10-26 DIAGNOSIS — I10 HYPERTENSION, ESSENTIAL: Primary | ICD-10-CM

## 2022-10-26 DIAGNOSIS — I50.20 SYSTOLIC CONGESTIVE HEART FAILURE, UNSPECIFIED HF CHRONICITY: ICD-10-CM

## 2022-10-26 PROCEDURE — 36416 COLLJ CAPILLARY BLOOD SPEC: CPT | Performed by: PHYSICIAN ASSISTANT

## 2022-10-26 PROCEDURE — 85610 PROTHROMBIN TIME: CPT | Performed by: PHYSICIAN ASSISTANT

## 2022-10-26 PROCEDURE — 99214 OFFICE O/P EST MOD 30 MIN: CPT | Performed by: PHYSICIAN ASSISTANT

## 2022-10-26 PROCEDURE — 1111F DSCHRG MED/CURRENT MED MERGE: CPT | Performed by: PHYSICIAN ASSISTANT

## 2022-10-26 RX ORDER — DAPAGLIFLOZIN 10 MG/1
TABLET, FILM COATED ORAL
COMMUNITY
Start: 2022-10-21 | End: 2022-11-09 | Stop reason: SINTOL

## 2022-10-26 RX ORDER — METOPROLOL SUCCINATE 50 MG/1
50 TABLET, EXTENDED RELEASE ORAL 2 TIMES DAILY
COMMUNITY
Start: 2022-10-21 | End: 2022-11-15 | Stop reason: SDUPTHER

## 2022-10-26 RX ORDER — SACUBITRIL AND VALSARTAN 24; 26 MG/1; MG/1
TABLET, FILM COATED ORAL
COMMUNITY
Start: 2022-10-21 | End: 2022-11-15 | Stop reason: SDUPTHER

## 2022-10-26 NOTE — PROGRESS NOTES
"Chief Complaint  Hospital Follow Up Visit    Subjective          Tanner Douglas Jr. presents to [facility]  History of Present Illness  Patient in today for hospital f/up. He was admitted to [facility] on 10/18/2022 and discharged on 10/21/2022 with diagnosis of congestive heart failure- estimated ejection fraction was 25-30%. He has been started on Entresto, Farxiga, and his metoprolol was adjusted to metoprolol succinate. He has f/up with cardiology in 5 days. He denies any fever since out of hospital. He has noticed some soreness to his tongue with some mild  white coating- was on antibiotic in hospital and concerned for possible thrush. In hospital was diagnosed with 4.4 cm ascending aortic aneurysm - recommendation for medical mgt and monitoring of blood pressure. He is now on O2 therapy 24 hrs/day. He currently denies any chest pain or shortness of breath. Bilateral lower leg swelling may be a bit better per family member.       Objective   Vital Signs:   /60   Pulse 65   Resp 20   Ht (!) 18 cm (7.09\")   SpO2 93%   BMI 2575.70 kg/m²     Body mass index is 2,575.7 kg/m².    Review of Systems   Constitutional: Negative for fatigue.   HENT: Negative for congestion and sore throat.    Respiratory: Negative for cough, shortness of breath and wheezing.    Cardiovascular: Positive for leg swelling. Negative for chest pain and palpitations.   Gastrointestinal: Negative for abdominal pain, blood in stool, diarrhea, nausea and vomiting.   Neurological: Negative for dizziness and headache.       Past History:  Medical History: has a past medical history of A-fib (HCC), Basal cell carcinoma, Benign hypertension, CAD (coronary artery disease), Chronic anticoagulation, Chronic gout without tophus, Colon perforation (HCC), Coronary artery disease (2007), Edema, Gout, Hard of hearing, High risk medication use, History of use of hearing aid in both ears, Hyperlipidemia, Hypertension, " Hypothyroid, and Thyroid disease.   Surgical History: has a past surgical history that includes Cholecystectomy; Hernia repair; Knee surgery; Small Bowel Resection; Abdominal surgery; ERCP (N/A, 02/03/2018); Excision basal cell carcinoma; Prostate surgery (2004); and Angioplasty.   Family History: family history is not on file.   Social History: reports that he has never smoked. He has never used smokeless tobacco. He reports that he does not drink alcohol and does not use drugs.      Current Outpatient Medications:   •  allopurinol (ZYLOPRIM) 100 MG tablet, Take 1 tablet by mouth Daily., Disp: 90 tablet, Rfl: 1  •  amLODIPine (NORVASC) 10 MG tablet, Take 1 tablet by mouth Daily., Disp: 90 tablet, Rfl: 1  •  Entresto 24-26 MG tablet, , Disp: , Rfl:   •  Farxiga 10 MG tablet, , Disp: , Rfl:   •  furosemide (LASIX) 20 MG tablet, Take 2 tablets by mouth Daily., Disp: 180 tablet, Rfl: 1  •  KLOR-CON 20 MEQ CR tablet, TAKE ONE TABLET BY MOUTH TWICE A DAY, Disp: 180 tablet, Rfl: 1  •  levothyroxine (SYNTHROID, LEVOTHROID) 50 MCG tablet, Take 1 tablet by mouth Daily., Disp: 90 tablet, Rfl: 1  •  Multiple Vitamins-Minerals (MULTIVITAMIN PO), Take 1 tablet by mouth Daily., Disp: , Rfl:   •  terazosin (HYTRIN) 5 MG capsule, Take 1 capsule by mouth every night at bedtime., Disp: 90 capsule, Rfl: 1  •  warfarin (Coumadin) 2 MG tablet, Take 1 tablet by mouth Every Night., Disp: 30 tablet, Rfl: 0  •  metoprolol succinate XL (TOPROL-XL) 50 MG 24 hr tablet, , Disp: , Rfl:   •  nystatin (MYCOSTATIN) 100,000 unit/mL suspension, Swish and spit 5mL every 6 hrs, prn for thrush, Disp: 120 mL, Rfl: 0  Allergies: Clavulanic acid, Clopidogrel bisulfate, Morphine and related, Naproxen, and Sulfa antibiotics    Physical Exam  Constitutional:       Appearance: Normal appearance.   Cardiovascular:      Rate and Rhythm: Rhythm irregular.   Pulmonary:      Effort: Pulmonary effort is normal.      Breath sounds: Normal breath sounds.   Abdominal:       Palpations: Abdomen is soft.      Tenderness: There is no abdominal tenderness.   Musculoskeletal:      Right lower le+ Edema present.      Left lower le+ Edema present.   Neurological:      Mental Status: He is alert and oriented to person, place, and time.   Psychiatric:         Mood and Affect: Mood normal.         Behavior: Behavior normal.             Assessment and Plan   Diagnoses and all orders for this visit:    1. Hypertension, essential (Primary)  Discussed with family member, his bp is on low end today- he states they have been monitoring at home and staying in the 120s/70s. Recommend they monitor this afternoon and evening. Was recommended on hospital discharge to consider to decrease the amlodipine if blood pressure staying low so will need to closely monitor. Keep f/up with cardiology in 5 days, however to ER if any return of progression of symptoms.   2. Systolic congestive heart failure, unspecified HF chronicity (HCC)    3. Thrush  Sent in nystatin to use prn for thrush ; if not improving, rtc for further evaluation.   Other orders  -     nystatin (MYCOSTATIN) 100,000 unit/mL suspension; Swish and spit 5mL every 6 hrs, prn for thrush  Dispense: 120 mL; Refill: 0            Follow Up   No follow-ups on file.  Patient was given instructions and counseling regarding his condition or for health maintenance advice. Please see specific information pulled into the AVS if appropriate.     Dasha De La Paz PA-C

## 2022-10-27 LAB — INR PPP: 2.9 (ref 2–3)

## 2022-11-02 ENCOUNTER — CLINICAL SUPPORT (OUTPATIENT)
Dept: FAMILY MEDICINE CLINIC | Facility: CLINIC | Age: 87
End: 2022-11-02
Payer: MEDICARE

## 2022-11-02 ENCOUNTER — LAB (OUTPATIENT)
Dept: FAMILY MEDICINE CLINIC | Facility: CLINIC | Age: 87
End: 2022-11-02

## 2022-11-02 DIAGNOSIS — I50.20 SYSTOLIC CONGESTIVE HEART FAILURE, UNSPECIFIED HF CHRONICITY: Primary | ICD-10-CM

## 2022-11-02 LAB — INR PPP: 2.4 (ref 0.9–1.1)

## 2022-11-02 PROCEDURE — 93793 ANTICOAG MGMT PT WARFARIN: CPT | Performed by: FAMILY MEDICINE

## 2022-11-02 PROCEDURE — 36416 COLLJ CAPILLARY BLOOD SPEC: CPT | Performed by: FAMILY MEDICINE

## 2022-11-02 PROCEDURE — 36415 COLL VENOUS BLD VENIPUNCTURE: CPT | Performed by: FAMILY MEDICINE

## 2022-11-02 PROCEDURE — 85610 PROTHROMBIN TIME: CPT | Performed by: FAMILY MEDICINE

## 2022-11-03 LAB
ALBUMIN SERPL-MCNC: 3.9 G/DL (ref 3.5–4.6)
ALBUMIN/GLOB SERPL: 2.2 {RATIO} (ref 1.2–2.2)
ALP SERPL-CCNC: 91 IU/L (ref 44–121)
ALT SERPL-CCNC: 13 IU/L (ref 0–44)
AST SERPL-CCNC: 20 IU/L (ref 0–40)
BILIRUB SERPL-MCNC: 1 MG/DL (ref 0–1.2)
BUN SERPL-MCNC: 27 MG/DL (ref 10–36)
BUN/CREAT SERPL: 17 (ref 10–24)
CALCIUM SERPL-MCNC: 9.7 MG/DL (ref 8.6–10.2)
CHLORIDE SERPL-SCNC: 103 MMOL/L (ref 96–106)
CO2 SERPL-SCNC: 28 MMOL/L (ref 20–29)
CREAT SERPL-MCNC: 1.61 MG/DL (ref 0.76–1.27)
EGFRCR SERPLBLD CKD-EPI 2021: 40 ML/MIN/1.73
GLOBULIN SER CALC-MCNC: 1.8 G/DL (ref 1.5–4.5)
GLUCOSE SERPL-MCNC: 83 MG/DL (ref 70–99)
MAGNESIUM SERPL-MCNC: 2 MG/DL (ref 1.6–2.3)
POTASSIUM SERPL-SCNC: 4.8 MMOL/L (ref 3.5–5.2)
PROT SERPL-MCNC: 5.7 G/DL (ref 6–8.5)
SODIUM SERPL-SCNC: 147 MMOL/L (ref 134–144)

## 2022-11-07 ENCOUNTER — TELEPHONE (OUTPATIENT)
Dept: FAMILY MEDICINE CLINIC | Facility: CLINIC | Age: 87
End: 2022-11-07

## 2022-11-07 RX ORDER — FLUCONAZOLE 150 MG/1
150 TABLET ORAL ONCE
Qty: 1 TABLET | Refills: 3 | Status: SHIPPED | OUTPATIENT
Start: 2022-11-07 | End: 2022-11-07

## 2022-11-07 NOTE — TELEPHONE ENCOUNTER
Caller: Mike Stock    Relationship: Emergency Contact    Best call back number: [phone]    What medication are you requesting: SOMETHING FOR YEAST INFECTION    What are your current symptoms: GENITAL YEAST INFECTION    How long have you been experiencing symptoms: 11/3/22    Have you had these symptoms before:    [] Yes  [x] No    Have you been treated for these symptoms before:   [] Yes  [x] No    If a prescription is needed, what is your preferred pharmacy and phone number: [facility] [id number] [address] [place] - [phone] [facility] [phone] FX     Additional notes: MIKE BELIEVES HER FATHER HAS A GENITAL YEAST INFECTION DUE TO FARXIGA AS IT IS A SIDE EFFECT OF THIS MEDICATION.

## 2022-11-09 ENCOUNTER — OFFICE VISIT (OUTPATIENT)
Dept: FAMILY MEDICINE CLINIC | Facility: CLINIC | Age: 87
End: 2022-11-09

## 2022-11-09 ENCOUNTER — TELEPHONE (OUTPATIENT)
Dept: FAMILY MEDICINE CLINIC | Facility: CLINIC | Age: 87
End: 2022-11-09

## 2022-11-09 VITALS
SYSTOLIC BLOOD PRESSURE: 108 MMHG | DIASTOLIC BLOOD PRESSURE: 70 MMHG | HEART RATE: 84 BPM | OXYGEN SATURATION: 94 % | HEIGHT: 69 IN | WEIGHT: 194 LBS | BODY MASS INDEX: 28.73 KG/M2

## 2022-11-09 DIAGNOSIS — B37.9 CANDIDIASIS: Primary | ICD-10-CM

## 2022-11-09 PROCEDURE — 99214 OFFICE O/P EST MOD 30 MIN: CPT | Performed by: FAMILY MEDICINE

## 2022-11-09 RX ORDER — CEPHALEXIN 500 MG/1
500 CAPSULE ORAL 3 TIMES DAILY
Qty: 30 CAPSULE | Refills: 0 | Status: SHIPPED | OUTPATIENT
Start: 2022-11-09

## 2022-11-09 RX ORDER — FLUCONAZOLE 150 MG/1
150 TABLET ORAL ONCE
Qty: 5 TABLET | Refills: 0 | Status: SHIPPED | OUTPATIENT
Start: 2022-11-09 | End: 2022-11-09

## 2022-11-09 RX ORDER — NYSTATIN 100000 U/G
1 CREAM TOPICAL 2 TIMES DAILY
Qty: 60 G | Refills: 1 | Status: SHIPPED | OUTPATIENT
Start: 2022-11-09

## 2022-11-10 NOTE — PROGRESS NOTES
Follow Up Office Visit      Date of Visit:  2022   Patient Name: Tanner Douglas Jr.  : 1929   MRN: [id number]     Chief Complaint:    Chief Complaint   Patient presents with   • POSSIBLE YEAST       History of Present Illness: Tanner Douglas Jr. is a 89 y.o. male who is here today for follow up.  Patient was recently started on Farxiga for congestive heart failure.  He has subsequently developed a fairly severe yeast infection.        Subjective      Review of Systems:   Review of Systems   Constitutional: Negative for fatigue and fever.   HENT: Negative for congestion and ear pain.    Respiratory: Negative for apnea, cough, chest tightness and shortness of breath.    Cardiovascular: Negative for chest pain.   Gastrointestinal: Negative for abdominal pain, constipation, diarrhea and nausea.   Genitourinary: Positive for genital sores and scrotal swelling.   Musculoskeletal: Negative for arthralgias.   Psychiatric/Behavioral: Negative for depressed mood and stress.       Past Medical History:   Past Medical History:   Diagnosis Date   • A-fib (HCC)     PAROXYSMAL   • Basal cell carcinoma    • Benign hypertension    • CAD (coronary artery disease)    • Chronic anticoagulation    • Chronic gout without tophus    • Colon perforation (HCC)    • Coronary artery disease 2007    STRESS TEST PROBABLY NUCLEAR   • Edema     BILATERAL LOWER LIMB   • Gout    • Hard of hearing     BILATERAL   • High risk medication use    • History of use of hearing aid in both ears    • Hyperlipidemia    • Hypertension    • Hypothyroid    • Thyroid disease        Past Surgical History:   Past Surgical History:   Procedure Laterality Date   • ABDOMINAL SURGERY     • ANGIOPLASTY      CORONARY ARTERY DISEASE  AND    • BASAL CELL CARCINOMA EXCISION     • CHOLECYSTECTOMY     • COLON RESECTION SMALL BOWEL     • ERCP N/A 2018    Procedure: ENDOSCOPIC RETROGRADE CHOLANGIOPANCREATOGRAPHY;  Surgeon: Wilfrid Justin,  MD;  Location: [facility] ENDOSCOPY;  Service:    • HERNIA REPAIR     • KNEE SURGERY     • PROSTATE SURGERY  2004       Family History: No family history on file.    Social History:   Social History     Socioeconomic History   • Marital status:    Tobacco Use   • Smoking status: Never   • Smokeless tobacco: Never   Vaping Use   • Vaping Use: Never used   Substance and Sexual Activity   • Alcohol use: No   • Drug use: No   • Sexual activity: Defer       Medications:     Current Outpatient Medications:   •  allopurinol (ZYLOPRIM) 100 MG tablet, Take 1 tablet by mouth Daily., Disp: 90 tablet, Rfl: 1  •  amLODIPine (NORVASC) 10 MG tablet, Take 1 tablet by mouth Daily., Disp: 90 tablet, Rfl: 1  •  Entresto 24-26 MG tablet, , Disp: , Rfl:   •  furosemide (LASIX) 20 MG tablet, Take 2 tablets by mouth Daily., Disp: 180 tablet, Rfl: 1  •  KLOR-CON 20 MEQ CR tablet, TAKE ONE TABLET BY MOUTH TWICE A DAY, Disp: 180 tablet, Rfl: 1  •  levothyroxine (SYNTHROID, LEVOTHROID) 50 MCG tablet, Take 1 tablet by mouth Daily., Disp: 90 tablet, Rfl: 1  •  metoprolol succinate XL (TOPROL-XL) 50 MG 24 hr tablet, 1 tablet 2 (Two) Times a Day., Disp: , Rfl:   •  Multiple Vitamins-Minerals (MULTIVITAMIN PO), Take 1 tablet by mouth Daily., Disp: , Rfl:   •  terazosin (HYTRIN) 5 MG capsule, Take 1 capsule by mouth every night at bedtime., Disp: 90 capsule, Rfl: 1  •  warfarin (Coumadin) 2 MG tablet, Take 1 tablet by mouth Every Night., Disp: 30 tablet, Rfl: 0  •  cephalexin (Keflex) 500 MG capsule, Take 1 capsule by mouth 3 (Three) Times a Day., Disp: 30 capsule, Rfl: 0  •  fluconazole (Diflucan) 150 MG tablet, Take 1 tablet by mouth 1 (One) Time for 1 dose., Disp: 5 tablet, Rfl: 0  •  nystatin (MYCOSTATIN) 100,000 unit/mL suspension, Swish and spit 5mL every 6 hrs, prn for thrush, Disp: 120 mL, Rfl: 0  •  nystatin (MYCOSTATIN) 163410 UNIT/GM cream, Apply 1 application topically to the appropriate area as directed 2 (Two) Times a Day., Disp:  "60 g, Rfl: 1    Allergies:   Allergies   Allergen Reactions   • Clavulanic Acid Unknown - Low Severity   • Clopidogrel Bisulfate Unknown - Low Severity   • Morphine And Related Delirium   • Naproxen Unknown - Low Severity   • Sulfa Antibiotics Unknown - Low Severity       Objective     Physical Exam:  Vital Signs:   Vitals:    11/09/22 1608   BP: 108/70   Pulse: 84   SpO2: 94%   Weight: 88 kg (194 lb)   Height: 175.3 cm (69\")     Body mass index is 28.65 kg/m².     Physical Exam  Vitals and nursing note reviewed.   Constitutional:       General: He is not in acute distress.     Appearance: Normal appearance. He is not ill-appearing.   HENT:      Head: Normocephalic and atraumatic.      Right Ear: Tympanic membrane and ear canal normal.      Left Ear: Tympanic membrane and ear canal normal.      Nose: Nose normal.   Cardiovascular:      Rate and Rhythm: Normal rate and regular rhythm.      Heart sounds: Normal heart sounds.   Pulmonary:      Effort: Pulmonary effort is normal.      Breath sounds: Normal breath sounds.   Genitourinary:     Comments: Patient with some redness and scrotal swelling.  Not circumcised.  Signs of yeast infection.  Neurological:      Mental Status: He is alert and oriented to person, place, and time. Mental status is at baseline.   Psychiatric:         Mood and Affect: Mood normal.         Procedures      Assessment / Plan      Assessment/Plan:   Diagnoses and all orders for this visit:    1. Candidiasis (Primary)    Other orders  -     fluconazole (Diflucan) 150 MG tablet; Take 1 tablet by mouth 1 (One) Time for 1 dose.  Dispense: 5 tablet; Refill: 0  -     cephalexin (Keflex) 500 MG capsule; Take 1 capsule by mouth 3 (Three) Times a Day.  Dispense: 30 capsule; Refill: 0  -     nystatin (MYCOSTATIN) [id number] UNIT/GM cream; Apply 1 application topically to the appropriate area as directed 2 (Two) Times a Day.  Dispense: 60 g; Refill: 1         With the severity of the infection, gave her 5 " days of Diflucan, Keflex, and nystatin topical.  Daughter will report back to us on Friday how he is doing.    Follow Up:   No follow-ups on file.    Kelechi Calvert  [facility]

## 2022-11-14 ENCOUNTER — TELEPHONE (OUTPATIENT)
Dept: FAMILY MEDICINE CLINIC | Facility: CLINIC | Age: 87
End: 2022-11-14

## 2022-11-14 NOTE — TELEPHONE ENCOUNTER
Caller: Mitzi Stock    Relationship: Emergency Contact    Best call back number: [phone]    What orders are you requesting (i.e. lab or imaging): AN ORDER FOR HOME HEALTH     In what timeframe would the patient need to come in: ASAP    Where will you receive your lab/imaging services: HOME HEALTH    Additional notes:

## 2022-11-15 RX ORDER — METOPROLOL SUCCINATE 50 MG/1
50 TABLET, EXTENDED RELEASE ORAL 2 TIMES DAILY
Qty: 60 TABLET | Refills: 2 | Status: SHIPPED | OUTPATIENT
Start: 2022-11-15

## 2022-11-15 RX ORDER — SACUBITRIL AND VALSARTAN 24; 26 MG/1; MG/1
1 TABLET, FILM COATED ORAL 2 TIMES DAILY
Qty: 60 TABLET | Refills: 3 | Status: SHIPPED | OUTPATIENT
Start: 2022-11-15

## 2022-11-17 ENCOUNTER — TELEPHONE (OUTPATIENT)
Dept: FAMILY MEDICINE CLINIC | Facility: CLINIC | Age: 87
End: 2022-11-17

## 2022-11-17 ENCOUNTER — CLINICAL SUPPORT (OUTPATIENT)
Dept: FAMILY MEDICINE CLINIC | Facility: CLINIC | Age: 87
End: 2022-11-17

## 2022-11-17 DIAGNOSIS — R30.0 DYSURIA: Primary | ICD-10-CM

## 2022-11-17 LAB
BILIRUB BLD-MCNC: NEGATIVE MG/DL
CLARITY, POC: CLEAR
COLOR UR: NORMAL
EXPIRATION DATE: NORMAL
GLUCOSE UR STRIP-MCNC: NEGATIVE MG/DL
KETONES UR QL: NEGATIVE
LEUKOCYTE EST, POC: NEGATIVE
Lab: NORMAL
NITRITE UR-MCNC: NEGATIVE MG/ML
PH UR: 5 [PH] (ref 5–8)
PROT UR STRIP-MCNC: NEGATIVE MG/DL
RBC # UR STRIP: NEGATIVE /UL
SP GR UR: 1.01 (ref 1–1.03)
UROBILINOGEN UR QL: NORMAL

## 2022-11-17 PROCEDURE — 81003 URINALYSIS AUTO W/O SCOPE: CPT | Performed by: FAMILY MEDICINE

## 2022-11-17 PROCEDURE — 99212 OFFICE O/P EST SF 10 MIN: CPT | Performed by: FAMILY MEDICINE

## 2022-11-17 RX ORDER — WARFARIN SODIUM 2 MG/1
2 TABLET ORAL NIGHTLY
Qty: 30 TABLET | Refills: 2 | Status: SHIPPED | OUTPATIENT
Start: 2022-11-17

## 2022-11-17 NOTE — TELEPHONE ENCOUNTER
Shelby from [facility] called; asked that we fax records along with the order for patient to be admitted into care. They will need H&P, medication lists, and any office notes that we can provide. Fax number is [phone] ATTN Shelby. They will need these records in order to expedite his transition into care

## 2022-11-21 ENCOUNTER — DOCUMENTATION (OUTPATIENT)
Dept: FAMILY MEDICINE CLINIC | Facility: CLINIC | Age: 87
End: 2022-11-21

## 2022-11-21 ENCOUNTER — LAB (OUTPATIENT)
Dept: FAMILY MEDICINE CLINIC | Facility: CLINIC | Age: 87
End: 2022-11-21

## 2022-11-21 ENCOUNTER — TELEPHONE (OUTPATIENT)
Dept: FAMILY MEDICINE CLINIC | Facility: CLINIC | Age: 87
End: 2022-11-21

## 2022-11-21 DIAGNOSIS — R60.9 EDEMA, UNSPECIFIED TYPE: ICD-10-CM

## 2022-11-21 DIAGNOSIS — R60.9 EDEMA, UNSPECIFIED TYPE: Primary | ICD-10-CM

## 2022-11-21 PROCEDURE — 36415 COLL VENOUS BLD VENIPUNCTURE: CPT | Performed by: FAMILY MEDICINE

## 2022-11-21 RX ORDER — LEVOFLOXACIN 500 MG/1
500 TABLET, FILM COATED ORAL DAILY
Qty: 10 TABLET | Refills: 0 | Status: SHIPPED | OUTPATIENT
Start: 2022-11-21

## 2022-11-21 RX ORDER — LEVOFLOXACIN 500 MG/1
500 TABLET, FILM COATED ORAL DAILY
Qty: 10 TABLET | Refills: 0 | Status: CANCELLED | OUTPATIENT
Start: 2022-11-21 | End: 2022-12-01

## 2022-11-23 LAB
ALBUMIN SERPL-MCNC: 3 G/DL (ref 3.5–4.6)
ALBUMIN/GLOB SERPL: 1.4 {RATIO} (ref 1.2–2.2)
ALP SERPL-CCNC: 103 IU/L (ref 44–121)
ALT SERPL-CCNC: 11 IU/L (ref 0–44)
AST SERPL-CCNC: 25 IU/L (ref 0–40)
BACTERIA UR CULT: ABNORMAL
BACTERIA UR CULT: ABNORMAL
BASOPHILS # BLD AUTO: 0 X10E3/UL (ref 0–0.2)
BASOPHILS NFR BLD AUTO: 0 %
BILIRUB SERPL-MCNC: 0.5 MG/DL (ref 0–1.2)
BUN SERPL-MCNC: 46 MG/DL (ref 10–36)
BUN/CREAT SERPL: 26 (ref 10–24)
CALCIUM SERPL-MCNC: 8.1 MG/DL (ref 8.6–10.2)
CHLORIDE SERPL-SCNC: 106 MMOL/L (ref 96–106)
CO2 SERPL-SCNC: 25 MMOL/L (ref 20–29)
CREAT SERPL-MCNC: 1.75 MG/DL (ref 0.76–1.27)
EGFRCR SERPLBLD CKD-EPI 2021: 36 ML/MIN/1.73
EOSINOPHIL # BLD AUTO: 0.1 X10E3/UL (ref 0–0.4)
EOSINOPHIL NFR BLD AUTO: 1 %
ERYTHROCYTE [DISTWIDTH] IN BLOOD BY AUTOMATED COUNT: 15.9 % (ref 11.6–15.4)
GLOBULIN SER CALC-MCNC: 2.1 G/DL (ref 1.5–4.5)
GLUCOSE SERPL-MCNC: 125 MG/DL (ref 70–99)
HCT VFR BLD AUTO: 41.7 % (ref 37.5–51)
HGB BLD-MCNC: 13.8 G/DL (ref 13–17.7)
IMM GRANULOCYTES # BLD AUTO: 0.1 X10E3/UL (ref 0–0.1)
IMM GRANULOCYTES NFR BLD AUTO: 1 %
LYMPHOCYTES # BLD AUTO: 0.5 X10E3/UL (ref 0.7–3.1)
LYMPHOCYTES NFR BLD AUTO: 5 %
MCH RBC QN AUTO: 27 PG (ref 26.6–33)
MCHC RBC AUTO-ENTMCNC: 33.1 G/DL (ref 31.5–35.7)
MCV RBC AUTO: 82 FL (ref 79–97)
MONOCYTES # BLD AUTO: 0.5 X10E3/UL (ref 0.1–0.9)
MONOCYTES NFR BLD AUTO: 6 %
NEUTROPHILS # BLD AUTO: 8.1 X10E3/UL (ref 1.4–7)
NEUTROPHILS NFR BLD AUTO: 87 %
OTHER ANTIBIOTIC SUSC ISLT: ABNORMAL
PLATELET # BLD AUTO: 347 X10E3/UL (ref 150–450)
POTASSIUM SERPL-SCNC: 5 MMOL/L (ref 3.5–5.2)
PROT SERPL-MCNC: 5.1 G/DL (ref 6–8.5)
RBC # BLD AUTO: 5.11 X10E6/UL (ref 4.14–5.8)
SODIUM SERPL-SCNC: 147 MMOL/L (ref 134–144)
WBC # BLD AUTO: 9.3 X10E3/UL (ref 3.4–10.8)

## 2023-02-28 ENCOUNTER — TELEPHONE (OUTPATIENT)
Dept: FAMILY MEDICINE CLINIC | Facility: CLINIC | Age: 88
End: 2023-02-28
Payer: MEDICARE

## 2023-03-01 NOTE — TELEPHONE ENCOUNTER
His inr is 1.2 right now. He is taking 1mg daily. I asked nancy how to document this coming from the nursing home and she instructed me to ask if you wanted the nursing home doctor to manage his INR or will you take these calls and manage? It looks like a dr. Denver morel had been seeing him in the nursing home. The nurse told me he was 1.2 last week and he has been taking 1mg this whole time without change.          Once dr. Calvert replies-get with nancy to document

## 2023-03-02 ENCOUNTER — TELEPHONE (OUTPATIENT)
Dept: FAMILY MEDICINE CLINIC | Facility: CLINIC | Age: 88
End: 2023-03-02
Payer: MEDICARE

## 2023-03-02 ENCOUNTER — CLINICAL SUPPORT (OUTPATIENT)
Dept: FAMILY MEDICINE CLINIC | Facility: CLINIC | Age: 88
End: 2023-03-02
Payer: MEDICARE

## 2023-03-02 DIAGNOSIS — I48.91 ATRIAL FIBRILLATION, UNSPECIFIED TYPE: Primary | ICD-10-CM

## 2023-03-02 LAB — INR PPP: 1.2 (ref 0.9–1.1)

## 2023-03-02 PROCEDURE — 36416 COLLJ CAPILLARY BLOOD SPEC: CPT | Performed by: FAMILY MEDICINE

## 2023-03-02 PROCEDURE — 85610 PROTHROMBIN TIME: CPT | Performed by: FAMILY MEDICINE

## 2023-03-02 NOTE — TELEPHONE ENCOUNTER
Jenny from [facility] called to report patients INR level on the morning of 3/1/23 was a 1.2, asking for a call back to discuss.

## 2023-03-15 ENCOUNTER — TELEPHONE (OUTPATIENT)
Dept: FAMILY MEDICINE CLINIC | Facility: CLINIC | Age: 88
End: 2023-03-15
Payer: MEDICARE

## 2023-03-15 DIAGNOSIS — R60.9 EDEMA, UNSPECIFIED TYPE: Primary | ICD-10-CM

## 2023-03-15 RX ORDER — FUROSEMIDE 40 MG/1
80 TABLET ORAL DAILY
Qty: 180 TABLET | Refills: 0 | Status: SHIPPED | OUTPATIENT
Start: 2023-03-15

## 2023-03-15 RX ORDER — METOLAZONE 2.5 MG/1
2.5 TABLET ORAL DAILY
COMMUNITY
End: 2023-03-15 | Stop reason: SDUPTHER

## 2023-03-15 RX ORDER — METOLAZONE 2.5 MG/1
TABLET ORAL
Qty: 30 TABLET | Refills: 0 | Status: SHIPPED | OUTPATIENT
Start: 2023-03-15

## 2023-03-15 NOTE — TELEPHONE ENCOUNTER
Spoke to Enrike and he said to increase his lasix to 80mg once daily and add on metolazone 2.5mg 1 tablet weekly, give us an update in a few days to let us know. Explained all of this to the nurse and sent in refills of his meds

## 2023-03-15 NOTE — TELEPHONE ENCOUNTER
[facility] called for the patient needing to speak with Dr Calvert the patient has gained 10 pounds in one week their number is  please call

## 2023-03-16 ENCOUNTER — TELEPHONE (OUTPATIENT)
Dept: FAMILY MEDICINE CLINIC | Facility: CLINIC | Age: 88
End: 2023-03-16
Payer: MEDICARE

## 2023-03-16 NOTE — TELEPHONE ENCOUNTER
Jenny called from [facility]. Pt's INR result this week is 1.4. Pt currently taking 1mg Coumadin on Tues/Thurs/Sat/Sun and 2 mg Mon/Wed/Fri. Please return call to advise on any medication changed.

## 2023-03-17 NOTE — TELEPHONE ENCOUNTER
Lets increase the Coumadin to 2 mg on Monday Wednesday Friday Saturday.  Use 1 mg the other days of the week.  We can recheck in 2 weeks.  Please see how he is doing with his swelling.

## 2023-03-20 ENCOUNTER — TELEPHONE (OUTPATIENT)
Dept: FAMILY MEDICINE CLINIC | Facility: CLINIC | Age: 88
End: 2023-03-20
Payer: MEDICARE

## 2023-03-20 NOTE — TELEPHONE ENCOUNTER
Caller: SHAHBAZ    Relationship to patient: Provider    Best call back number: [phone]    Patient is needing: SHAHBAZ FROM [place] STATED THAT SHE WOULD LIKE TO SPEAK WITH CLINICAL TO GIVE PATIENT READINGS SINCE PROVIDER INCREASES HIS LASIX MEDICATION    SHAHBAZ GAVE HUB THIS INFO: 3:15 196.2 AND TODAY 191.8 BUT REQUESTED CLINICAL STAFF    PLEASE ADVISE

## 2023-03-20 NOTE — TELEPHONE ENCOUNTER
Jenny was gone for the day so I spoke to Enrike Lucero said to keep meds the way they are now and check a BMP later in the week.

## 2023-04-04 ENCOUNTER — TELEPHONE (OUTPATIENT)
Dept: FAMILY MEDICINE CLINIC | Facility: CLINIC | Age: 88
End: 2023-04-04
Payer: MEDICARE

## 2023-04-04 NOTE — TELEPHONE ENCOUNTER
He had his INR 1.3, he takes 2mg every Mon, Wed, Fri and 1mg all other days  I will call Jenny or Jazmine back with instructions at the [facility] [phone]    Enrike wants him to take 1mg Tue and Thursday and 2 mg all other days, recheck in 2 weeks, Jazmine contacted

## 2023-04-10 ENCOUNTER — OFFICE VISIT (OUTPATIENT)
Dept: FAMILY MEDICINE CLINIC | Facility: CLINIC | Age: 88
End: 2023-04-10
Payer: MEDICARE

## 2023-04-10 VITALS
SYSTOLIC BLOOD PRESSURE: 120 MMHG | WEIGHT: 190 LBS | DIASTOLIC BLOOD PRESSURE: 68 MMHG | BODY MASS INDEX: 28.06 KG/M2 | HEART RATE: 77 BPM | OXYGEN SATURATION: 95 %

## 2023-04-10 DIAGNOSIS — R60.9 EDEMA, UNSPECIFIED TYPE: ICD-10-CM

## 2023-04-10 DIAGNOSIS — I48.91 ATRIAL FIBRILLATION, UNSPECIFIED TYPE: Primary | ICD-10-CM

## 2023-04-10 DIAGNOSIS — H61.21 IMPACTED CERUMEN OF RIGHT EAR: ICD-10-CM

## 2023-04-10 LAB — INR PPP: 1.4 (ref 0.9–1.1)

## 2023-04-10 RX ORDER — METOLAZONE 2.5 MG/1
TABLET ORAL
Qty: 30 TABLET | Refills: 5 | Status: SHIPPED | OUTPATIENT
Start: 2023-04-10

## 2023-04-11 NOTE — PROGRESS NOTES
Follow Up Office Visit      Date of Visit:  04/10/2023   Patient Name: Tanner Douglas Jr.  : 1929   MRN: [id number]     Chief Complaint:    Chief Complaint   Patient presents with   • Cerumen Impaction   • Needs toenails clipped   • Edema       History of Present Illness: Tanner Douglas Jr. is a 89 y.o. male who is here today for follow up.  Patient here because of some lower extremity edema.  Quite severe edema all the way up to his groin almost.  Weight is up.  No shortness of breath.  We reviewed his diuretics.  He needed his INR checked today and Coumadin adjusted.  He also needed the cerumen removed from his ears.  Used lavage.  We also trimmed his toenails for him this morning.        Subjective      Review of Systems:   Review of Systems   Constitutional: Negative for fatigue and fever.   HENT: Positive for hearing loss. Negative for congestion and ear pain.    Respiratory: Negative for apnea, cough, chest tightness and shortness of breath.    Cardiovascular: Positive for leg swelling. Negative for chest pain.   Gastrointestinal: Negative for abdominal pain, constipation, diarrhea and nausea.   Musculoskeletal: Negative for arthralgias.   Psychiatric/Behavioral: Negative for depressed mood and stress.       Past Medical History:   Past Medical History:   Diagnosis Date   • A-fib     PAROXYSMAL   • Basal cell carcinoma    • Benign hypertension    • CAD (coronary artery disease)    • Chronic anticoagulation    • Chronic gout without tophus    • Colon perforation    • Coronary artery disease     STRESS TEST PROBABLY NUCLEAR   • Edema     BILATERAL LOWER LIMB   • Gout    • Hard of hearing     BILATERAL   • High risk medication use    • History of use of hearing aid in both ears    • Hyperlipidemia    • Hypertension    • Hypothyroid    • Thyroid disease        Past Surgical History:   Past Surgical History:   Procedure Laterality Date   • ABDOMINAL SURGERY     • ANGIOPLASTY      CORONARY ARTERY  DISEASE 1999 AND 2000   • BASAL CELL CARCINOMA EXCISION     • CHOLECYSTECTOMY     • COLON RESECTION SMALL BOWEL     • ERCP N/A 02/03/2018    Procedure: ENDOSCOPIC RETROGRADE CHOLANGIOPANCREATOGRAPHY;  Surgeon: Wilfrid Justin MD;  Location: [facility] ENDOSCOPY;  Service:    • HERNIA REPAIR     • KNEE SURGERY     • PROSTATE SURGERY  2004       Family History: No family history on file.    Social History:   Social History     Socioeconomic History   • Marital status:    Tobacco Use   • Smoking status: Never   • Smokeless tobacco: Never   Vaping Use   • Vaping Use: Never used   Substance and Sexual Activity   • Alcohol use: No   • Drug use: No   • Sexual activity: Defer       Medications:     Current Outpatient Medications:   •  metOLazone (ZAROXOLYN) 2.5 MG tablet, 1 po daily 30 min before lasix., Disp: 30 tablet, Rfl: 5  •  allopurinol (ZYLOPRIM) 100 MG tablet, Take 1 tablet by mouth Daily., Disp: 90 tablet, Rfl: 1  •  amLODIPine (NORVASC) 10 MG tablet, Take 1 tablet by mouth Daily., Disp: 90 tablet, Rfl: 1  •  cephalexin (Keflex) 500 MG capsule, Take 1 capsule by mouth 3 (Three) Times a Day., Disp: 30 capsule, Rfl: 0  •  Entresto 24-26 MG tablet, Take 1 tablet by mouth 2 (Two) Times a Day., Disp: 60 tablet, Rfl: 3  •  furosemide (LASIX) 40 MG tablet, Take 2 tablets by mouth Daily., Disp: 180 tablet, Rfl: 0  •  KLOR-CON 20 MEQ CR tablet, TAKE ONE TABLET BY MOUTH TWICE A DAY, Disp: 180 tablet, Rfl: 1  •  levoFLOXacin (Levaquin) 500 MG tablet, Take 1 tablet by mouth Daily., Disp: 10 tablet, Rfl: 0  •  levothyroxine (SYNTHROID, LEVOTHROID) 50 MCG tablet, Take 1 tablet by mouth Daily., Disp: 90 tablet, Rfl: 1  •  metoprolol succinate XL (TOPROL-XL) 50 MG 24 hr tablet, Take 1 tablet by mouth 2 (Two) Times a Day., Disp: 60 tablet, Rfl: 2  •  Multiple Vitamins-Minerals (MULTIVITAMIN PO), Take 1 tablet by mouth Daily., Disp: , Rfl:   •  nystatin (MYCOSTATIN) 100,000 unit/mL suspension, Swish and spit 5mL every 6 hrs,  prn for thrush, Disp: 120 mL, Rfl: 0  •  nystatin (MYCOSTATIN) 164351 UNIT/GM cream, Apply 1 application topically to the appropriate area as directed 2 (Two) Times a Day., Disp: 60 g, Rfl: 1  •  terazosin (HYTRIN) 5 MG capsule, Take 1 capsule by mouth every night at bedtime., Disp: 90 capsule, Rfl: 1  •  warfarin (Coumadin) 2 MG tablet, Take 1 tablet by mouth Every Night., Disp: 30 tablet, Rfl: 2    Allergies:   Allergies   Allergen Reactions   • Clavulanic Acid Unknown - Low Severity   • Clopidogrel Bisulfate Unknown - Low Severity   • Morphine And Related Delirium   • Naproxen Unknown - Low Severity   • Sulfa Antibiotics Unknown - Low Severity       Objective     Physical Exam:  Vital Signs:   Vitals:    04/10/23 1435   BP: 120/68   Pulse: 77   SpO2: 95%   Weight: 86.2 kg (190 lb)     Body mass index is 28.06 kg/m².     Physical Exam  Vitals and nursing note reviewed.   Constitutional:       General: He is not in acute distress.     Appearance: Normal appearance. He is not ill-appearing.   HENT:      Head: Normocephalic and atraumatic.      Right Ear: Tympanic membrane and ear canal normal.      Left Ear: Tympanic membrane and ear canal normal.      Nose: Nose normal.   Cardiovascular:      Rate and Rhythm: Normal rate and regular rhythm.      Heart sounds: Normal heart sounds.   Pulmonary:      Effort: Pulmonary effort is normal.      Breath sounds: Normal breath sounds.   Neurological:      Mental Status: He is alert and oriented to person, place, and time. Mental status is at baseline.   Psychiatric:         Mood and Affect: Mood normal.         Procedures      Assessment / Plan      Assessment/Plan:   Diagnoses and all orders for this visit:    1. Atrial fibrillation, unspecified type (Primary)  -     POCT INR  -     Ear Cerumen Removal    2. Edema, unspecified type  -     metOLazone (ZAROXOLYN) 2.5 MG tablet; 1 po daily 30 min before lasix.  Dispense: 30 tablet; Refill: 5  -     POCT INR  -     Ear Cerumen  Removal    3. Impacted cerumen of right ear  -     POCT INR  -     Ear Cerumen Removal         Were going to use the Zaroxolyn every day.  We have been using it just once per week.  Continue the Lasix.  Recheck renal function and in about 1 week.  We did clean out his ears today.  Adjust his Coumadin.  Did do a quick trim of some of his toenails for him.    Follow Up:   No follow-ups on file.    Kelechi Calvert  [facility] [place]

## 2023-04-26 ENCOUNTER — TELEPHONE (OUTPATIENT)
Dept: FAMILY MEDICINE CLINIC | Facility: CLINIC | Age: 88
End: 2023-04-26
Payer: MEDICARE

## 2023-04-26 NOTE — TELEPHONE ENCOUNTER
[facility] called saying the patient's INR is 1.6 and asked if Cely or Dr Calvert could call them as soon as possible their number is

## 2023-04-26 NOTE — TELEPHONE ENCOUNTER
[facility] changed coumadin directions to Monday and Friday 4mg and all other days to 2mg, recheck in 2 weeks

## 2023-05-10 ENCOUNTER — TELEPHONE (OUTPATIENT)
Dept: FAMILY MEDICINE CLINIC | Facility: CLINIC | Age: 88
End: 2023-05-10
Payer: MEDICARE

## 2023-05-10 NOTE — TELEPHONE ENCOUNTER
Caller: SHAHBAZ VALVERDE    Relationship:     Best call back number: [phone]    What is the best time to reach you: ANY    Who are you requesting to speak with (clinical staff, provider,  specific staff member): CLINICAL     What was the call regarding:       NEEDS TO CALL INR    HIS INR WAS 3.3    WARFARIN 2 MG ON Sunday, Tuesday, Wednesday, Thursday, Saturday AND 4 MG ON Monday AND Friday     DOES DOCTOR WANT TO KEEP OR CHANGE    Do you require a callback: YES

## 2023-05-11 NOTE — TELEPHONE ENCOUNTER
Enrike wants to change his coumadin dosage to 2mg everyday except Friday do 4 mg, recheck INR in 2 weeks--while I was on the phone with Jenny at the [facility] she had a critical BUN come over, I'll discuss with Enrike and call her back.

## 2023-05-25 ENCOUNTER — TELEPHONE (OUTPATIENT)
Dept: FAMILY MEDICINE CLINIC | Facility: CLINIC | Age: 88
End: 2023-05-25
Payer: MEDICARE

## 2023-05-25 NOTE — TELEPHONE ENCOUNTER
Caller: DARLENE    Relationship: Home Health    Best call back number: [phone]    What is the best time to reach you: ANYTIME    Who are you requesting to speak with (clinical staff, provider,  specific staff member): CLINICAL    Do you know the name of the person who called: DARLENE    What was the call regarding: REPORTING INR OF 3.3 FOR PATIENT    Do you require a callback: PLEASE ADVISE  
INR Results were good, Enrike said stay on same dose and recheck in 1 month  Shirley at the [facility] contacted  
49

## 2023-05-31 ENCOUNTER — TELEPHONE (OUTPATIENT)
Dept: FAMILY MEDICINE CLINIC | Facility: CLINIC | Age: 88
End: 2023-05-31
Payer: MEDICARE

## 2023-05-31 NOTE — TELEPHONE ENCOUNTER
Caller: SHAHBAZ LUTZ [place]     Relationship: Other     Best call back number: [phone]     What form or medical record are you requesting: LAST OFFICE VISIT NOTES AND A DOCUMENT/NOTE WITH PARAMETERS FOR BLOOD PRESSURE MEDICINE    Who is requesting this form or medical record from you: SHAHBAZ LUTZ [place]     How would you like to receive the form or medical records (pick-up, mail, fax):      ATTN: SHAHBAZ CASILLAS [facility])  FAX: [phone]     Timeframe paperwork needed: ASAP     Additional notes: PLEASE FAX OVER PROMPTLY TO NETTA LUTZ [place]

## 2023-05-31 NOTE — LETTER
June 7, 2023    Tanner Douglas Jr.  [address]  [place] [address]        To Whom It May Concern:    If Mr Tanner Douglas's blood pressure is below 100/55 he is to hold his daily blood pressure medication.  If his blood pressure is above 100/55 please proceed with his prescribed dosage of blood pressure medication.  If you require any other information please call the office.                        Kelechi Calvert MD

## 2023-06-02 ENCOUNTER — TELEPHONE (OUTPATIENT)
Dept: FAMILY MEDICINE CLINIC | Facility: CLINIC | Age: 88
End: 2023-06-02

## 2023-06-02 NOTE — TELEPHONE ENCOUNTER
Hub staff attempted to follow warm transfer process and was unsuccessful     Caller: SHAHBAZ/ [facility]    Relationship to patient: CARE CENTER    Best call back number: [phone]    Patient is needing: HIS LAB SHOULD A CRITICAL FOR BUN AT 74.9

## 2023-06-02 NOTE — TELEPHONE ENCOUNTER
2 weeks ago his BUN was 102, it has come down some. Calvert wants to decrease the metolazone to every Friday and stay on lasix every Monday, Wed, and Friday. Recheck BMP in 2 weeks  Georges contacted, spoke to Jenny

## 2023-06-07 NOTE — TELEPHONE ENCOUNTER
She said that he had some lower bp readings and the pharmacy requires them to have parameters in place when they are on blood pressure meds, so they want you to tell them at what point he should not take his blood pressure meds depending on his readings.

## 2023-06-14 ENCOUNTER — OFFICE VISIT (OUTPATIENT)
Dept: FAMILY MEDICINE CLINIC | Facility: CLINIC | Age: 88
End: 2023-06-14
Payer: MEDICARE

## 2023-06-14 VITALS
WEIGHT: 185.9 LBS | HEIGHT: 69 IN | HEART RATE: 64 BPM | SYSTOLIC BLOOD PRESSURE: 128 MMHG | DIASTOLIC BLOOD PRESSURE: 68 MMHG | BODY MASS INDEX: 27.53 KG/M2

## 2023-06-14 DIAGNOSIS — L89.90 PRESSURE INJURY OF SKIN, UNSPECIFIED INJURY STAGE, UNSPECIFIED LOCATION: Primary | ICD-10-CM

## 2023-06-14 DIAGNOSIS — R60.9 EDEMA, UNSPECIFIED TYPE: ICD-10-CM

## 2023-06-14 DIAGNOSIS — I48.91 ATRIAL FIBRILLATION, UNSPECIFIED TYPE: ICD-10-CM

## 2023-06-14 LAB — INR PPP: 4.4 (ref 0.9–1.1)

## 2023-06-14 PROCEDURE — 99214 OFFICE O/P EST MOD 30 MIN: CPT | Performed by: FAMILY MEDICINE

## 2023-06-14 PROCEDURE — 36416 COLLJ CAPILLARY BLOOD SPEC: CPT | Performed by: FAMILY MEDICINE

## 2023-06-14 PROCEDURE — 85610 PROTHROMBIN TIME: CPT | Performed by: FAMILY MEDICINE

## 2023-06-15 LAB
BUN SERPL-MCNC: 75 MG/DL (ref 10–36)
BUN/CREAT SERPL: 38 (ref 10–24)
CALCIUM SERPL-MCNC: 8.8 MG/DL (ref 8.6–10.2)
CHLORIDE SERPL-SCNC: 105 MMOL/L (ref 96–106)
CO2 SERPL-SCNC: 19 MMOL/L (ref 20–29)
CREAT SERPL-MCNC: 1.98 MG/DL (ref 0.76–1.27)
EGFRCR SERPLBLD CKD-EPI 2021: 31 ML/MIN/1.73
GLUCOSE SERPL-MCNC: 106 MG/DL (ref 70–99)
POTASSIUM SERPL-SCNC: 4.7 MMOL/L (ref 3.5–5.2)
SODIUM SERPL-SCNC: 144 MMOL/L (ref 134–144)

## 2023-06-15 NOTE — PROGRESS NOTES
Follow Up Office Visit      Date of Visit:  2023   Patient Name: Tanner Douglas Jr.  : 1929   MRN: [id number]     Chief Complaint:    Chief Complaint   Patient presents with    Sore     Pt comes  in today for possible sore bottom     Edema     Pt here today with caregivers to discuss fluid medication        History of Present Illness: Tanner Douglas Jr. is a 89 y.o. male who is here today for follow up.  Patient seen for pressure ulceration as well as evaluation of his edema.  Has been on diuretics but having a difficult time with renal function.  Does also need evaluation for his A-fib and Coumadin today.        Subjective      Review of Systems:   Review of Systems   Constitutional:  Negative for fatigue and fever.   HENT:  Negative for congestion and ear pain.    Respiratory:  Negative for apnea, cough, chest tightness and shortness of breath.    Cardiovascular:  Negative for chest pain.   Gastrointestinal:  Negative for abdominal pain, constipation, diarrhea and nausea.   Musculoskeletal:  Negative for arthralgias.   Psychiatric/Behavioral:  Negative for depressed mood and stress.      Past Medical History:   Past Medical History:   Diagnosis Date    A-fib     PAROXYSMAL    Basal cell carcinoma     Benign hypertension     CAD (coronary artery disease)     Chronic anticoagulation     Chronic gout without tophus     Colon perforation     Coronary artery disease 2007    STRESS TEST PROBABLY NUCLEAR    Edema     BILATERAL LOWER LIMB    Gout     Hard of hearing     BILATERAL    High risk medication use     History of use of hearing aid in both ears     Hyperlipidemia     Hypertension     Hypothyroid     Thyroid disease        Past Surgical History:   Past Surgical History:   Procedure Laterality Date    ABDOMINAL SURGERY      ANGIOPLASTY      CORONARY ARTERY DISEASE  AND     BASAL CELL CARCINOMA EXCISION      CHOLECYSTECTOMY      COLON RESECTION SMALL BOWEL      ERCP N/A 2018     Procedure: ENDOSCOPIC RETROGRADE CHOLANGIOPANCREATOGRAPHY;  Surgeon: Wilfrid Justin MD;  Location: [facility] ENDOSCOPY;  Service:     HERNIA REPAIR      KNEE SURGERY      PROSTATE SURGERY  2004       Family History: No family history on file.    Social History:   Social History     Socioeconomic History    Marital status:    Tobacco Use    Smoking status: Never    Smokeless tobacco: Never   Vaping Use    Vaping Use: Never used   Substance and Sexual Activity    Alcohol use: No    Drug use: No    Sexual activity: Defer       Medications:     Current Outpatient Medications:     allopurinol (ZYLOPRIM) 100 MG tablet, Take 1 tablet by mouth Daily., Disp: 90 tablet, Rfl: 1    amLODIPine (NORVASC) 10 MG tablet, Take 1 tablet by mouth Daily., Disp: 90 tablet, Rfl: 1    cephalexin (Keflex) 500 MG capsule, Take 1 capsule by mouth 3 (Three) Times a Day., Disp: 30 capsule, Rfl: 0    Entresto 24-26 MG tablet, Take 1 tablet by mouth 2 (Two) Times a Day., Disp: 60 tablet, Rfl: 3    furosemide (LASIX) 40 MG tablet, Take 2 tablets by mouth Daily., Disp: 180 tablet, Rfl: 0    KLOR-CON 20 MEQ CR tablet, TAKE ONE TABLET BY MOUTH TWICE A DAY, Disp: 180 tablet, Rfl: 1    levoFLOXacin (Levaquin) 500 MG tablet, Take 1 tablet by mouth Daily., Disp: 10 tablet, Rfl: 0    levothyroxine (SYNTHROID, LEVOTHROID) 50 MCG tablet, Take 1 tablet by mouth Daily., Disp: 90 tablet, Rfl: 1    metOLazone (ZAROXOLYN) 2.5 MG tablet, 1 po daily 30 min before lasix., Disp: 30 tablet, Rfl: 5    metoprolol succinate XL (TOPROL-XL) 50 MG 24 hr tablet, Take 1 tablet by mouth 2 (Two) Times a Day., Disp: 60 tablet, Rfl: 2    Multiple Vitamins-Minerals (MULTIVITAMIN PO), Take 1 tablet by mouth Daily., Disp: , Rfl:     nystatin (MYCOSTATIN) 100,000 unit/mL suspension, Swish and spit 5mL every 6 hrs, prn for thrush, Disp: 120 mL, Rfl: 0    nystatin (MYCOSTATIN) 083107 UNIT/GM cream, Apply 1 application topically to the appropriate area as directed 2 (Two)  "Times a Day., Disp: 60 g, Rfl: 1    terazosin (HYTRIN) 5 MG capsule, Take 1 capsule by mouth every night at bedtime., Disp: 90 capsule, Rfl: 1    warfarin (Coumadin) 2 MG tablet, Take 1 tablet by mouth Every Night., Disp: 30 tablet, Rfl: 2    Allergies:   Allergies   Allergen Reactions    Clavulanic Acid Unknown - Low Severity    Clopidogrel Bisulfate Unknown - Low Severity    Morphine And Related Delirium    Naproxen Unknown - Low Severity    Sulfa Antibiotics Unknown - Low Severity       Objective     Physical Exam:  Vital Signs:   Vitals:    06/14/23 1505   BP: 128/68   BP Location: Left arm   Patient Position: Sitting   Cuff Size: Large Adult   Pulse: 64   Weight: 84.3 kg (185 lb 14.4 oz)   Height: 175.3 cm (69\")     Body mass index is 27.45 kg/m².     Physical Exam  Vitals and nursing note reviewed.   Constitutional:       General: He is not in acute distress.     Appearance: Normal appearance. He is not ill-appearing.   HENT:      Head: Normocephalic and atraumatic.      Right Ear: Tympanic membrane and ear canal normal.      Left Ear: Tympanic membrane and ear canal normal.      Nose: Nose normal.   Cardiovascular:      Rate and Rhythm: Normal rate and regular rhythm.      Heart sounds: Normal heart sounds.   Pulmonary:      Effort: Pulmonary effort is normal.      Breath sounds: Normal breath sounds.   Neurological:      Mental Status: He is alert and oriented to person, place, and time. Mental status is at baseline.   Psychiatric:         Mood and Affect: Mood normal.       Procedures      Assessment / Plan      Assessment/Plan:   Diagnoses and all orders for this visit:    1. Atrial fibrillation, unspecified type (Primary)  -     POCT INR  -     Basic metabolic panel; Future  -     Basic metabolic panel    2. Pressure injury of skin, unspecified injury stage, unspecified location  -     Ambulatory Referral to Wound Clinic  -     Basic metabolic panel; Future  -     Basic metabolic panel    3. Edema, " unspecified type  -     Basic metabolic panel; Future  -     Basic metabolic panel         Made referral to wound care for the pressure ulcer.  Checking appropriate labs because of his edema and diuretics.  Adjusted dose of Coumadin.    Follow Up:   No follow-ups on file.    Kelechi Calvert  [facility] [place] Change dose to 2 mg daily.   Recheck 3 wks.  None today or tomorrow.

## 2023-08-04 ENCOUNTER — TELEPHONE (OUTPATIENT)
Dept: FAMILY MEDICINE CLINIC | Facility: CLINIC | Age: 88
End: 2023-08-04
Payer: MEDICARE

## 2023-08-04 NOTE — TELEPHONE ENCOUNTER
Caller: NETTA CAMEJO    Relationship: senior care    Best call back number: [phone]     What is the best time to reach you: ANY     Who are you requesting to speak with (clinical staff, provider,  specific staff member): CLINICAL       What was the call regarding: INR RESULT OF 4.6 WANTED TO REPORT     Is it okay if the provider responds through MyChart: NO

## 2023-08-07 NOTE — TELEPHONE ENCOUNTER
Jenny from the [facility] called in regards to the critical result, she said he takes 2mg at bedtime and is still having a signifigant amount of swelling. Call back #[phone]

## 2023-08-07 NOTE — TELEPHONE ENCOUNTER
No coumadin for 2 days then do 2mg every other day and alternate with 1/2 pill.      Have to take the 80mg lasix daily and go ahead and take the zaroxyln daily as well.

## 2023-08-08 ENCOUNTER — TELEPHONE (OUTPATIENT)
Dept: FAMILY MEDICINE CLINIC | Facility: CLINIC | Age: 88
End: 2023-08-08
Payer: MEDICARE

## 2023-08-08 NOTE — TELEPHONE ENCOUNTER
Caller: SHAHBAZ WITH [facility]    Best call back number:      [phone]     What orders are you requesting (i.e. lab or imaging):  VERBAL ORDER TO RECHECK INR AND BMP      Additional notes: PLEASE CALL WITH ORDER

## 2023-08-14 ENCOUNTER — TELEPHONE (OUTPATIENT)
Dept: FAMILY MEDICINE CLINIC | Facility: CLINIC | Age: 88
End: 2023-08-14
Payer: MEDICARE

## 2023-08-14 NOTE — TELEPHONE ENCOUNTER
"HUB TO READ- \"Dr Calvert wants to see Mr Douglas this week due to fatigue and swelling.  I put him down for a 11:15 appt tomorrow 08/15/23 and left the daughter a message to make sure they could get him here, if not please let us know and we will try to find another time\"  "

## 2023-08-16 ENCOUNTER — TELEPHONE (OUTPATIENT)
Dept: FAMILY MEDICINE CLINIC | Facility: CLINIC | Age: 88
End: 2023-08-16
Payer: MEDICARE

## 2023-08-30 ENCOUNTER — TELEPHONE (OUTPATIENT)
Dept: FAMILY MEDICINE CLINIC | Facility: CLINIC | Age: 88
End: 2023-08-30
Payer: MEDICARE

## 2023-08-30 NOTE — TELEPHONE ENCOUNTER
His reference range on INR is 2.0-3.0, his reading was 3.1  Stay on same dose of coumadin and recheck in a month. I spoke to Jazmine at the [facility]

## 2023-08-30 NOTE — TELEPHONE ENCOUNTER
Hub staff attempted to follow warm transfer process and was unsuccessful     Caller: SHAHBAZ WITH [facility]    Relationship to patient:     Best call back number: [phone]     Patient is needing:   SHAHBAZ WITH [facility] WOULD LIKE A CALL BACK TO REPORT CRITICAL INR RESULTS ON THIS PATIENT     UNABLE TO WARM TRANSFER CALL TO THE OFFICE 4X'S TRIED TO WARM TRANSFER CALL TO THE OFFICE BUT WAS UNSUCCESSFUL

## 2023-08-31 ENCOUNTER — TELEPHONE (OUTPATIENT)
Dept: FAMILY MEDICINE CLINIC | Facility: CLINIC | Age: 88
End: 2023-08-31

## 2023-09-04 NOTE — TELEPHONE ENCOUNTER
Please talk with Mitzi.  Let her know that the diuretics are drying out quite a bit.  Really not a lot else to really do for the swelling.  May be starting to get to a situation with the labs and his swelling that we may not be making a ton of progress.  May be time to think about having him on the skilled side of the nursing home.

## 2023-09-21 ENCOUNTER — TELEPHONE (OUTPATIENT)
Dept: FAMILY MEDICINE CLINIC | Facility: CLINIC | Age: 88
End: 2023-09-21
Payer: MEDICARE

## 2023-09-21 NOTE — TELEPHONE ENCOUNTER
Caller:  SHAHBAZ CHADWICK     Relationship: [unfilled]     Best call back number: [phone]    What is your medical concern? CALLED STATED THAT PT TAKES RX  furosemide (LASIX) 80 MG tablet  AND      metOLazone (ZAROXOLYN) 2.5 MG tablet    STATED THAT PT IS DOWN  POUNDS AND WILL LIKED TO KNOW IF PT SHOULD LAY OFF RX    metOLazone (ZAROXOLYN) 2.5 MG tablet

## 2023-09-29 ENCOUNTER — TELEPHONE (OUTPATIENT)
Dept: FAMILY MEDICINE CLINIC | Facility: CLINIC | Age: 88
End: 2023-09-29

## 2023-09-29 NOTE — TELEPHONE ENCOUNTER
Caller:NELL WITH [facility]     Phone:[phone]     INR Number Being Reported: INR 1.9      Day of the Week  Monday Tuesday Wednesday Thursday Friday Saturday Sunday     Dose Taken

## 2023-10-24 ENCOUNTER — TELEPHONE (OUTPATIENT)
Dept: FAMILY MEDICINE CLINIC | Facility: CLINIC | Age: 88
End: 2023-10-24
Payer: MEDICARE

## 2023-10-24 NOTE — TELEPHONE ENCOUNTER
Caller: SHAHBAZ [facility]        Best call back number:[phone]    Which medication are you concerned about:CALLED TO LET PT PCP KNOW THAT PT TEST POSITIVE FOR COVID TODAY

## 2023-10-25 NOTE — TELEPHONE ENCOUNTER
PATIENTS DAUGHTER CALLED BACK AND I RELAYED THE MESSAGE    DAUGHTER STATED HIS RENAL FUNCTION IS NOT GOOD AT ALL.    COVID POSITIVE AS OF 10/24/23 BUT IS HIS DOING OK. HE DOES NOT HAVE A FEVER, NO COUGH, HE IS STAYING HYDRATED AND HE EATS SOME. HE IS WEAK BUT OXYGEN IS 96/97, LUNGS ARE OK, BLOOD PRESSURE IS LOW BUT OK    SHE IS OK WITH DOING WHATEVER DR NICHOLS RECOMMENDS   [phone]

## 2023-10-25 NOTE — TELEPHONE ENCOUNTER
Please check with daughter.  See how he is doing.  With his renal function and medical problems taking the Paxlovid would really be contraindicated.     HUB TO RELAY     Left message for daughter to return call

## 2023-10-25 NOTE — TELEPHONE ENCOUNTER
Please check with daughter.  See how he is doing.  With his renal function and medical problems taking the Paxlovid would really be contraindicated.

## 2023-10-26 NOTE — TELEPHONE ENCOUNTER
HUB TO RELAY: LVM for daughter to call back for message from Dr Calvert  Typically we would just do supportive care.  Make sure we have plenty of fluids and are comfortable.  Using cough or congestion medicine if needed.  Typically would not use any type of prescription medication unless a specific symptom needed.  Please talk with daughter and let her know.

## 2023-10-26 NOTE — TELEPHONE ENCOUNTER
Typically we would just do supportive care.  Make sure we have plenty of fluids and are comfortable.  Using cough or congestion medicine if needed.  Typically would not use any type of prescription medication unless a specific symptom needed.  Please talk with daughter and let her know.

## 2023-10-30 ENCOUNTER — TELEPHONE (OUTPATIENT)
Dept: FAMILY MEDICINE CLINIC | Facility: CLINIC | Age: 88
End: 2023-10-30
Payer: MEDICARE

## 2023-11-06 ENCOUNTER — TELEPHONE (OUTPATIENT)
Dept: FAMILY MEDICINE CLINIC | Facility: CLINIC | Age: 88
End: 2023-11-06
Payer: MEDICARE

## 2023-11-06 NOTE — TELEPHONE ENCOUNTER
Caller: SHAHBAZ    Relationship: Other [facility]    Best call back number:      What is the best time to reach you: ANYTIME    Who are you requesting to speak with (clinical staff, provider,  specific staff member):  CLINICAL STAFF    Do you know the name of the person who called: SHAHBAZ    What was the call regarding: PATIENT FELL LAST MONDAY AND SHAHBAZ, AND FAMILY THINKS THE PATIENT WOULD BENEFIT FROM PHYSICAL THERAPY' HE HAS SKIN TEAR ON LEFT KNEE WHICH THEY ARE WRAPPING UP EVERY DAY, AND SPOT ON EAR, THEY ARE TAKING CARE OF AS WELL.;PHYSICAL THERAPY WOULD BE FOR WEAKNESS AS HE HAS BEEN UNABLE TO LEAVE HIS ROOM MUCH SINCE THE FALL.     Is it okay if the provider responds through MyChart: PHONE CALL

## 2023-11-13 ENCOUNTER — TELEPHONE (OUTPATIENT)
Dept: FAMILY MEDICINE CLINIC | Facility: CLINIC | Age: 88
End: 2023-11-13
Payer: MEDICARE

## 2023-11-13 NOTE — TELEPHONE ENCOUNTER
Provider: DR HAMILTON    Caller: SHAHBAZ    Relationship to Patient: NURSING HOME ([facility])    Phone Number: [phone]    Reason for Call: 1.2 INR ON 11/13/23. PATIENT HAS NOT HAD ANY CUMADIN SINCE 10/30/23 DUE TO A FALL DUE TO RESTART ON 11/14/23.

## 2023-11-28 ENCOUNTER — TELEPHONE (OUTPATIENT)
Dept: FAMILY MEDICINE CLINIC | Facility: CLINIC | Age: 88
End: 2023-11-28
Payer: MEDICARE

## 2023-11-28 NOTE — TELEPHONE ENCOUNTER
Caller: SHAHBAZ WITH [facility]     Relationship: [unfilled]     Best call back number:[phone]    What is your medical concern? CALLED TO REPORT PT INR REPORT AS 1.7

## 2023-12-01 NOTE — TELEPHONE ENCOUNTER
[facility], left voicemail to call back,     HUB TO RELAY--  LEAVE COUMADIN DOSE THE SAME, RECHECK INR IN 1 MONTH

## 2023-12-01 NOTE — TELEPHONE ENCOUNTER
Name: Mike Stock      Relationship: Emergency Contact      Best Callback Number: [phone]      HUB PROVIDED THE RELAY MESSAGE FROM THE OFFICE      PATIENT: VOICED UNDERSTANDING AND HAS NO FURTHER QUESTIONS AT THIS TIME    ADDITIONAL INFORMATION: MIKE, PATIENTS DAUGHTER, CALLED BACK. STATED HAD MISSED CALL. READ HER THE RELAY. SHE STATED SHE'D ASK [facility] TO CALL US BACK AND WOULD GIVE THEM THE MESSAGE.

## 2023-12-28 ENCOUNTER — TELEPHONE (OUTPATIENT)
Dept: FAMILY MEDICINE CLINIC | Facility: CLINIC | Age: 88
End: 2023-12-28
Payer: MEDICARE

## 2023-12-28 NOTE — TELEPHONE ENCOUNTER
Caller: SHAHBAZ - [facility]    Relationship to patient:     Best call back number: [phone]     Patient is needing: SHAHBAZ IS CALLING TO REPORT THE PATIENT HAS AN INR OF 1.7.

## 2023-12-29 NOTE — TELEPHONE ENCOUNTER
Level a little thick.  We most likely need to increase dose slightly but not sure how he is currently taking it.  Please check with pharmacy at the nursing home.

## 2024-01-02 NOTE — TELEPHONE ENCOUNTER
Then today would be a 2 mg day.      M  2mg  T 1mg  W 2mg  Th 1mg  F 2mg   S 2mg  S 1mg    Every Week.   Recheck 1 month

## 2024-01-02 NOTE — TELEPHONE ENCOUNTER
SHAHBAZ WITH [facility] CALLED BACK REGARDING PATIENTS INR. PLEASE CALL HER BACK TO ADVISE ASAP [phone].

## 2024-01-08 ENCOUNTER — TELEPHONE (OUTPATIENT)
Dept: FAMILY MEDICINE CLINIC | Facility: CLINIC | Age: 89
End: 2024-01-08
Payer: MEDICARE

## 2024-01-08 NOTE — TELEPHONE ENCOUNTER
Provider: LYLE HAMILTON    Caller: SHAHBAZ    Relationship to Patient:  [facility]    Phone Number: [phone]    Reason for Call:  TO LET THE PCP KNOW THAT THE PATIENT HAS GAINED 10.8 LBS , PATIENT HAS INCREASED EDEMA IN HIS LOWER EXTREMITIES  AND HIS CHEST AND BELLY

## 2024-01-08 NOTE — TELEPHONE ENCOUNTER
Caller: ShenMike isaacs    Relationship: Emergency Contact    Best call back number: [phone]     What is the best time to reach you: ANYTIME, OK TO LEAVE VOICEMAIL.    Who are you requesting to speak with (clinical staff, provider,  specific staff member): CLINICAL STAFF    Do you know the name of the person who called: PATIENT'S DAUGHTER MIKE    What was the call regarding: MIKE WOULD LIKE A CALL BACK TO DISCUSS PATIENT'S MEDICATION.    Is it okay if the provider responds through MyChart: NO CALL ONLY

## 2024-01-09 NOTE — TELEPHONE ENCOUNTER
Mitzi says he is just still gaining a lot of weight after he has d/c the fluid pills. Its harder for him to get around from all the fluid and he actually had a fall last week. She wants to know if you could maybe change up the fluid pills?

## 2024-01-09 NOTE — TELEPHONE ENCOUNTER
HUB TO RELAY--Left message for Mitzi to call back, Enrike wants to know how she thinks her dad is doing, he got the message on his swelling but wanted her opinion as well.

## 2024-01-09 NOTE — TELEPHONE ENCOUNTER
FROM  KIRSTEN CABRALES 01/09/24 AT 11:54 AM    Mitzi says he is just still gaining a lot of weight after he has d/c the fluid pills. Its harder for him to get around from all the fluid and he actually had a fall last week. She wants to know if you could maybe change up the fluid pills?          PLEASE SEE OTHER ENCOUNTER

## 2024-01-10 NOTE — TELEPHONE ENCOUNTER
Does this mean they were doing it a different way before?  Not sure my message was understood by the response?

## 2024-01-10 NOTE — TELEPHONE ENCOUNTER
Mitzi called back gave message per Dr Calvert she said they are administering medications mentioned as directed and that they are going to get the BMP as soon as possible

## 2024-01-10 NOTE — TELEPHONE ENCOUNTER
Did they dc a diuretic?   I had listed the furosemide to do 80 mg 5 days/week and to take the Zaroxolyn 2.5 mg at 30 minutes before the Lasix.  Please talk with Mitzi and see if that is what he is doing.  Also would like a BMP if possible.

## 2024-01-11 ENCOUNTER — TELEPHONE (OUTPATIENT)
Dept: FAMILY MEDICINE CLINIC | Facility: CLINIC | Age: 89
End: 2024-01-11

## 2024-01-11 NOTE — TELEPHONE ENCOUNTER
Provider: LYLE HAMILTON    Caller: SHAHBAZ    Relationship to Patient: [facility]    Phone Number: [phone]     Reason for Call: SHAHBAZ CALLIONG FROM THE [facility] IS CALLING TO REPORT A CRITICAL BUN ( 94)

## 2024-01-12 ENCOUNTER — TELEPHONE (OUTPATIENT)
Dept: FAMILY MEDICINE CLINIC | Facility: CLINIC | Age: 89
End: 2024-01-12
Payer: MEDICARE

## 2024-01-12 RX ORDER — BUMETANIDE 2 MG/1
2 TABLET ORAL DAILY
Qty: 30 TABLET | Refills: 2 | Status: CANCELLED | OUTPATIENT
Start: 2024-01-12

## 2024-01-12 NOTE — TELEPHONE ENCOUNTER
[facility] to [facility], gave them the new med orders and orders to recheck BMP in 2 weeks, Creatnine was 2.1

## 2024-01-12 NOTE — TELEPHONE ENCOUNTER
Ms. Alvarado needs to speak to someone in regards to pt's critical labs. Best call back number is [phone]

## 2024-01-12 NOTE — TELEPHONE ENCOUNTER
I sent a message to Brandon last night.  He is can continue the metolazone.  Switch out the Lasix to Bumex.  He will use 2 mg and take it once daily and we can see how things go for 2 weeks.  I like to have the results of that basic metabolic panel.  Please check on that.  Please also find out where the medication needs sent.  I did pend the medication.

## 2024-01-30 ENCOUNTER — TELEPHONE (OUTPATIENT)
Dept: FAMILY MEDICINE CLINIC | Facility: CLINIC | Age: 89
End: 2024-01-30
Payer: MEDICARE

## 2024-01-30 NOTE — TELEPHONE ENCOUNTER
Hub staff attempted to follow warm transfer process and was unsuccessful         Caller: SHAHBAZ    Relationship to patient: [facility]    Best call back number: [phone]    Patient is needing: CRITICAL FOR BUN IS 95 UP FROM 94 ON THE 11TH

## 2024-02-02 ENCOUNTER — TELEPHONE (OUTPATIENT)
Dept: FAMILY MEDICINE CLINIC | Facility: CLINIC | Age: 89
End: 2024-02-02
Payer: MEDICARE

## 2024-02-02 NOTE — TELEPHONE ENCOUNTER
Caller: SHAHBAZ - [facility]    Relationship to patient:     Best call back number: [phone]     Patient is needing: SHAHBAZ IS REQUESTING AN UPDATE ON THE LAB WORK FAXED OVER. SHE STATES SHE CALLED REGARDING AN ABNORMAL BUN RESULT AND IS WAITING TO HEAR BACK FROM PRIMARY CARE.

## 2024-02-05 ENCOUNTER — TELEPHONE (OUTPATIENT)
Dept: FAMILY MEDICINE CLINIC | Facility: CLINIC | Age: 89
End: 2024-02-05

## 2024-02-05 NOTE — TELEPHONE ENCOUNTER
Caller: [facility]      Best call back number: [phone]       What was the call regarding: CRITICAL B.U.N. LAST WEEK, HAVE NOT HEARD BACK FROM PCP. CALLING TODAY TO SEE IF PCP WANTS TO CHANGE ANYTHING ON INR 1.8, NEEDS A RECHECK DATE IF PCP DOES NOT.

## 2024-03-06 ENCOUNTER — TELEPHONE (OUTPATIENT)
Dept: FAMILY MEDICINE CLINIC | Facility: CLINIC | Age: 89
End: 2024-03-06
Payer: MEDICARE

## 2024-03-06 NOTE — TELEPHONE ENCOUNTER
Received call from [facility]. Patient's INR is testing at 2.2. Please advise.     [phone]

## 2024-04-11 ENCOUNTER — TELEPHONE (OUTPATIENT)
Dept: FAMILY MEDICINE CLINIC | Facility: CLINIC | Age: 89
End: 2024-04-11
Payer: MEDICARE

## 2024-04-11 NOTE — TELEPHONE ENCOUNTER
Caller: SHAHBAZ WITH [facility]     Relationship: [unfilled]     Best call back number: [phone]    What is your medical concern? CALLED TO  TO CHECK STATUS FOR PTINR THAT WAS FAXED OVER ON TUESDAY.

## 2024-12-03 NOTE — TELEPHONE ENCOUNTER
"Anesthesia Transfer of Care Note    Patient: Adam Goode    Procedure(s) Performed: Procedure(s) (LRB):  EGD (ESOPHAGOGASTRODUODENOSCOPY) (N/A)    Patient location: GI    Anesthesia Type: general    Transport from OR: Transported from OR on room air with adequate spontaneous ventilation    Post pain: adequate analgesia    Post assessment: no apparent anesthetic complications    Post vital signs: stable    Level of consciousness: lethargic and responds to stimulation    Nausea/Vomiting: no nausea/vomiting    Complications: none    Transfer of care protocol was followed      Last vitals: Visit Vitals  BP (!) 148/75   Pulse 65   Temp 36.7 °C (98.1 °F)   Resp 16   Ht 6' 3" (1.905 m)   Wt 79.1 kg (174 lb 6.1 oz)   SpO2 99%   BMI 21.80 kg/m²     " Lets say 100/55

## (undated) DEVICE — RETRIEVAL BALLOON CATHETER: Brand: EXTRACTOR™ PRO RX

## (undated) DEVICE — DEV LK WIREGUIDE FUSN OLYMP SCP

## (undated) DEVICE — ESOPHAGEAL/PYLORIC/COLONIC WIREGUIDED BALLOON DILATATION CATHETER: Brand: CRE WIREGUIDED

## (undated) DEVICE — ERBE NESSY®PLATE 170 SPLIT; 168CM²; CABLE 3M: Brand: ERBE

## (undated) DEVICE — SPHINCTEROTOME: Brand: DREAMTOME™ RX 44

## (undated) DEVICE — Device: Brand: DEFENDO AIR/WATER/SUCTION AND BIOPSY VALVE

## (undated) DEVICE — WIREGUIDED RETRIEVAL BASKET: Brand: TRAPEZOID RX